# Patient Record
Sex: FEMALE | Race: WHITE | NOT HISPANIC OR LATINO | Employment: FULL TIME | URBAN - NONMETROPOLITAN AREA
[De-identification: names, ages, dates, MRNs, and addresses within clinical notes are randomized per-mention and may not be internally consistent; named-entity substitution may affect disease eponyms.]

---

## 2017-01-17 ENCOUNTER — OFFICE VISIT (OUTPATIENT)
Dept: FAMILY MEDICINE | Facility: OTHER | Age: 32
End: 2017-01-17
Payer: COMMERCIAL

## 2017-01-17 VITALS
RESPIRATION RATE: 12 BRPM | SYSTOLIC BLOOD PRESSURE: 112 MMHG | DIASTOLIC BLOOD PRESSURE: 74 MMHG | WEIGHT: 166 LBS | HEART RATE: 87 BPM | TEMPERATURE: 97.1 F | HEIGHT: 62 IN | BODY MASS INDEX: 30.55 KG/M2 | OXYGEN SATURATION: 100 %

## 2017-01-17 DIAGNOSIS — L73.9 FOLLICULITIS: Primary | ICD-10-CM

## 2017-01-17 PROCEDURE — 99213 OFFICE O/P EST LOW 20 MIN: CPT | Performed by: INTERNAL MEDICINE

## 2017-01-17 RX ORDER — CEPHALEXIN 500 MG/1
500 CAPSULE ORAL 3 TIMES DAILY
Qty: 21 CAPSULE | Refills: 0 | Status: SHIPPED | OUTPATIENT
Start: 2017-01-17 | End: 2017-10-27

## 2017-01-17 ASSESSMENT — PAIN SCALES - GENERAL: PAINLEVEL: NO PAIN (0)

## 2017-01-17 NOTE — PROGRESS NOTES
SUBJECTIVE:                                                    Martin Daigle is a 31 year old female who presents to clinic today for the following health issues:      Chief Complaint   Patient presents with     Derm Problem     on face since yesterday       CHIEF COMPLAINT:    The patient is a pleasant 31-year-old female who presents today with a single skin lesion on her left cheek. It appears to be a slightly irritated and inflamed follicle with a surrounding area of erythema about 2 cm. No other specific lesions are noted. There is a slightly purulent center and a honey colored crust. She notes no other specific lesions of concern. She does have small children and is concerned regarding the possibility of this being contagious. It is minimally tender to touch.                         PAST, FAMILY,SOCIAL HISTORY:     Medical  History:   has a past medical history of Tinea versicolor (2010); ANXIETY STATE NOS (2001); ABNORM ELECTROCARDIOGRAM (2001); Syncope and collapse (2001); and maternal GBS (group B streptococcus) affected , pregnant (2/3/2016).     Surgical History:   has past surgical history that includes CREATE EARDRUM OPENING,GEN ANESTH and NONSPECIFIC PROCEDURE.     Social History:   reports that she has never smoked. She has never used smokeless tobacco. She reports that she drinks alcohol. She reports that she does not use illicit drugs.     Family History:  family history includes Allergies in her sister; CEREBROVASCULAR DISEASE in her paternal grandfather; DIABETES in her father and paternal grandfather; HEART DISEASE in her maternal grandfather; Hypertension in her father; OSTEOPOROSIS in her maternal grandmother.            MEDICATIONS  Current Outpatient Prescriptions   Medication Sig Dispense Refill     cephALEXin (KEFLEX) 500 MG capsule Take 1 capsule (500 mg) by mouth 3 times daily 21 capsule 0     mupirocin (BACTROBAN NASAL) 2 % nasal ointment Apply 1 g into  each nare 3 times daily for 7 days 10 g 0     norgestim-eth estrad triphasic (ORTHO TRI-CYCLEN, 28,) 0.18/0.215/0.25 MG-35 MCG per tablet Take 1 tablet by mouth daily 84 tablet 4     Prenatal Vit-DSS-Fe Cbn-FA (PRENATAL AD) TABS Take 1 tablet by mouth daily. 100 tablet 4         --------------------------------------------------------------------------------------------------------------------                          REVIEW OF SYSTEMS:         LUNGS: Pt denies: cough,excess sputum, hemoptysis, or shortness of breath.   HEART: Pt denies: chest pain, arrythmia, syncope, tachy or bradyarrhythmia or excess edema.   GI: Pt denies: nausea, vomitting, diarrhea, constipation, melena, or hematochezia.                          EXAMINATION:          Vital Signs:  B/P: 112/74, T: 97.1, P: 87, R: 12, BMI: Body mass index is 30.35 kg/(m^2).   Constitutional: The patient appears to be in no acute distress. The patient appears to be adequately hydrated. No acute respiratory or hemodynamic distress is noted at this time.   LUNGS: clear bilaterally, airflow is brisk, no intercostal retraction or stridor is noted. No coughing is noted during visit.   HEART:  regular without rubs, clicks, gallops, or murmurs. PMI is nondisplaced. Upstrokes are brisk. S1,S2 are heard.   SKIN:  warm and dry. Only the lesion is noted as above.                          DECISION MAKING:       #1 folliculitis face   Recommend Keflex 500 mg 3 times daily for 7 days (patient is breast-feeding)   Recommend Bactroban ointment 2% 1 dab 3 times daily                             FOLLOW UP    I have asked the patient to make an appointment for follow up with me if symptoms do not improve.    I have carefully explained the diagnosis and treatment options with the patient. The patient has displayed an understanding of the above, and all subsequent questions were answered.         DO LUISA Dan    Portions of this note were produced using Roundrate  360  Although every attempt at real-time proof reading has been made, occasional grammar/syntax errors may have been missed.

## 2017-09-23 ENCOUNTER — TELEPHONE (OUTPATIENT)
Dept: FAMILY MEDICINE | Facility: CLINIC | Age: 32
End: 2017-09-23

## 2017-09-23 DIAGNOSIS — Z30.011 ENCOUNTER FOR INITIAL PRESCRIPTION OF CONTRACEPTIVE PILLS: ICD-10-CM

## 2017-09-25 RX ORDER — NORGESTIMATE AND ETHINYL ESTRADIOL 7DAYSX3 28
1 KIT ORAL DAILY
Qty: 28 TABLET | Refills: 0 | Status: SHIPPED | OUTPATIENT
Start: 2017-09-25 | End: 2017-10-27

## 2017-09-25 NOTE — TELEPHONE ENCOUNTER
Rx refilled per RN protocol.  1 month    Will forward to schedulers to schedule patient for OV.  Laura Hood RN

## 2017-09-25 NOTE — TELEPHONE ENCOUNTER
norgestim-eth estrad triphasic (ORTHO TRI-CYCLEN, 28,) 0.18/0.215/0.25 MG-35 MCG per tablet      Last Written Prescription Date: 9/22/16  Last Fill Quantity: 84,  # refills: 4   Last Office Visit with FMELIZABET, [facility] or [facility] prescribing provider: 1/17/17

## 2017-10-04 ENCOUNTER — ALLIED HEALTH/NURSE VISIT (OUTPATIENT)
Dept: FAMILY MEDICINE | Facility: OTHER | Age: 32
End: 2017-10-04
Payer: COMMERCIAL

## 2017-10-04 DIAGNOSIS — Z23 NEED FOR PROPHYLACTIC VACCINATION AND INOCULATION AGAINST INFLUENZA: Primary | ICD-10-CM

## 2017-10-04 PROCEDURE — 90471 IMMUNIZATION ADMIN: CPT

## 2017-10-04 PROCEDURE — 90686 IIV4 VACC NO PRSV 0.5 ML IM: CPT

## 2017-10-04 PROCEDURE — 99207 ZZC NO CHARGE NURSE ONLY: CPT

## 2017-10-04 NOTE — MR AVS SNAPSHOT
After Visit Summary   10/4/2017    Martin Daigle    MRN: [id number]           Patient Information     Date Of Birth          1985        Visit Information        Provider Department      10/4/2017 4:00 PM DENZEL MUELLER NURSE, [facility]        Today's Diagnoses     Need for prophylactic vaccination and inoculation against influenza    -  1       Follow-ups after your visit        Your next 10 appointments already scheduled     Oct 27, 2017 11:30 AM CDT   [facility]    [address] [place] [id number] [phone]              Who to contact     If you have questions or need follow up information about today's clinic visit or your schedule please contact [facility] directly at [phone].  Normal or non-critical lab and imaging results will be communicated to you by MyChart, letter or phone within 4 business days after the clinic has received the results. If you do not hear from us within 7 days, please contact the clinic through MyChart or phone. If you have a critical or abnormal lab result, we will notify you by phone as soon as possible.  Submit refill requests through [employer] or call your pharmacy and they will forward the refill request to us. Please allow 3 business days for your refill to be completed.          Additional Information About Your Visit        MyChart Information     [employer] gives you secure access to your electronic health record. If you see a primary care provider, you can also send messages to your care team and make appointments. If you have questions, please call your primary care clinic.  If you do not have a primary care provider, please call [phone] and they will assist you.        Care EveryWhere ID     This is your Care EveryWhere ID. This could be used by other organizations to access your [facility] medical records  [id number]          Blood Pressure from Last 3 Encounters:   01/17/17 112/74   11/25/16 120/70   09/22/16 108/62    Weight from Last 3 Encounters:   01/17/17 166 lb (75.3 kg)   11/25/16 167 lb (75.8 kg)   11/21/16 167 lb (75.8 kg)              We Performed the Following     FLU VAC, SPLIT VIRUS IM > 3 YO (QUADRIVALENT) [10497]     Vaccine Administration, Initial [38103]        Primary Care Provider Office Phone # Fax #    Jerald Fenton [phone] [address] [place] [address]        Equal Access to Services     [facility]: Hadii ruben Putnam, waaxda luqanette, qaybta kaalmada nela, earnest johns. So [facility] [phone].    ATENCIÓN: Si habla español, tiene a abarca disposición servicios gratuitos de asistencia lingüística. [facility] [phone].    We comply with applicable federal civil rights laws and [place] laws. We do not discriminate on the basis of race, color, national origin, age, disability, sex, sexual orientation, or gender identity.            Thank you!     Thank you for choosing [facility]  for your care. Our goal is always to provide you with excellent care. Hearing back from our patients is one way we can continue to improve our services. Please take a few minutes to complete the written survey that you may receive in the mail after your visit with us. Thank you!             Your Updated Medication List - Protect others around you: Learn how to safely use, store and throw away your medicines at www.disposemymeds.org.          This list is accurate as of: 10/4/17  4:35 PM.  Always use your most recent med list.                   Brand Name Dispense Instructions for use Diagnosis    cephALEXin 500 MG capsule    KEFLEX    21 capsule    Take 1 capsule (500 mg) by mouth 3 times daily    Folliculitis       norgestim-eth estrad triphasic 0.18/0.215/0.25 MG-35 MCG per tablet    TRI-LINYAH    28 tablet    Take 1 tablet by mouth  daily Appointment needed for additional refills.    Encounter for initial prescription of contraceptive pills       PRENATAL AD Tabs     100 tablet    Take 1 tablet by mouth daily.    Prenatal consult, Routine general medical examination at a health care facility

## 2017-10-04 NOTE — PROGRESS NOTES
Injectable Influenza Immunization Documentation    1.  Is the person to be vaccinated sick today?   No    2. Does the person to be vaccinated have an allergy to a component   of the vaccine?   No    3. Has the person to be vaccinated ever had a serious reaction   to influenza vaccine in the past?   No    4. Has the person to be vaccinated ever had Guillain-Barré syndrome?   No    Form completed by Milena Shultz MA     10/4/2017  Prior to injection verified patient identity using patient's name and date of birth.

## 2017-10-27 ENCOUNTER — OFFICE VISIT (OUTPATIENT)
Dept: FAMILY MEDICINE | Facility: CLINIC | Age: 32
End: 2017-10-27
Payer: COMMERCIAL

## 2017-10-27 VITALS
OXYGEN SATURATION: 99 % | SYSTOLIC BLOOD PRESSURE: 100 MMHG | HEART RATE: 88 BPM | RESPIRATION RATE: 18 BRPM | WEIGHT: 163 LBS | HEIGHT: 62 IN | BODY MASS INDEX: 30 KG/M2 | TEMPERATURE: 98.2 F | DIASTOLIC BLOOD PRESSURE: 56 MMHG

## 2017-10-27 DIAGNOSIS — Z23 ENCOUNTER FOR IMMUNIZATION: ICD-10-CM

## 2017-10-27 DIAGNOSIS — Z00.00 ENCOUNTER FOR ROUTINE ADULT HEALTH EXAMINATION WITHOUT ABNORMAL FINDINGS: Primary | ICD-10-CM

## 2017-10-27 DIAGNOSIS — Z30.41 ENCOUNTER FOR SURVEILLANCE OF CONTRACEPTIVE PILLS: ICD-10-CM

## 2017-10-27 PROCEDURE — 90471 IMMUNIZATION ADMIN: CPT | Performed by: FAMILY MEDICINE

## 2017-10-27 PROCEDURE — 99395 PREV VISIT EST AGE 18-39: CPT | Mod: 25 | Performed by: FAMILY MEDICINE

## 2017-10-27 PROCEDURE — 90632 HEPA VACCINE ADULT IM: CPT | Performed by: FAMILY MEDICINE

## 2017-10-27 RX ORDER — NORGESTIMATE AND ETHINYL ESTRADIOL 7DAYSX3 28
1 KIT ORAL DAILY
Qty: 84 TABLET | Refills: 4 | Status: SHIPPED | OUTPATIENT
Start: 2017-10-27 | End: 2018-06-11

## 2017-10-27 ASSESSMENT — PAIN SCALES - GENERAL: PAINLEVEL: NO PAIN (0)

## 2017-10-27 NOTE — MR AVS SNAPSHOT
"              After Visit Summary   10/27/2017    Martin Daigle    MRN: [id number]           Patient Information     Date Of Birth          1985        Visit Information        Provider Department      10/27/2017 11:30 AM Jerald Fenton MD [facility]        Today's Diagnoses     Encounter for routine adult health examination without abnormal findings    -  1    Encounter for surveillance of contraceptive pills        Encounter for immunization           Follow-ups after your visit        Who to contact     If you have questions or need follow up information about today's clinic visit or your schedule please contact [facility] directly at [phone].  Normal or non-critical lab and imaging results will be communicated to you by MyChart, letter or phone within 4 business days after the clinic has received the results. If you do not hear from us within 7 days, please contact the clinic through [employer] or phone. If you have a critical or abnormal lab result, we will notify you by phone as soon as possible.  Submit refill requests through [employer] or call your pharmacy and they will forward the refill request to us. Please allow 3 business days for your refill to be completed.          Additional Information About Your Visit        MyChart Information     [employer] gives you secure access to your electronic health record. If you see a primary care provider, you can also send messages to your care team and make appointments. If you have questions, please call your primary care clinic.  If you do not have a primary care provider, please call [phone] and they will assist you.        Care EveryWhere ID     This is your Care EveryWhere ID. This could be used by other organizations to access your [facility] [facility] medical records  [id number]        Your Vitals Were     Pulse Temperature Respirations Height Last Period Pulse Oximetry    88 98.2  F (36.8  C) (Temporal) 18 5' 2\" (1.575 " m) 10/18/2017 99%    Breastfeeding? BMI (Body Mass Index)                No 29.81 kg/m2           Blood Pressure from Last 3 Encounters:   10/27/17 100/56   01/17/17 112/74   11/25/16 120/70    Weight from Last 3 Encounters:   10/27/17 163 lb (73.9 kg)   01/17/17 166 lb (75.3 kg)   11/25/16 167 lb (75.8 kg)              We Performed the Following     ADMIN 1st VACCINE     HEPATITIS A VACCINE (ADULT)          Today's Medication Changes          These changes are accurate as of: 10/27/17 11:59 PM.  If you have any questions, ask your nurse or doctor.               These medicines have changed or have updated prescriptions.        Dose/Directions    norgestim-eth estrad triphasic 0.18/0.215/0.25 MG-35 MCG per tablet   Commonly known as:  TRI-LINYAH   This may have changed:  additional instructions   Used for:  Encounter for surveillance of contraceptive pills   Changed by:  Jerald Fenton MD        Dose:  1 tablet   Take 1 tablet by mouth daily   Quantity:  84 tablet   Refills:  4         Stop taking these medicines if you haven't already. Please contact your care team if you have questions.     cephALEXin 500 MG capsule   Commonly known as:  KEFLEX   Stopped by:  Jerald Fenton MD                Where to get your medicines      These medications were sent to [facility] [address]   [address] [facility] [address]     Phone:  [phone]     norgestim-eth estrad triphasic 0.18/0.215/0.25 MG-35 MCG per tablet                Primary Care Provider Office Phone # Fax #    Jerald Fenton [phone]       [address] [place] [address]        Equal Access to Services     [facility]: Hadii ruben gant Sokorina, waaxda luqadaha, qaybta kaalmada adeegyada, earnest johns. So [facility] [phone].    ATENCIÓN: Si habla español, tiene a abarca disposición servicios gratuitos de asistencia lingüística. Llame al  [phone].    We comply with applicable federal civil rights laws and [place] laws. We do not discriminate on the basis of race, color, national origin, age, disability, sex, sexual orientation, or gender identity.            Thank you!     Thank you for choosing [facility]  for your care. Our goal is always to provide you with excellent care. Hearing back from our patients is one way we can continue to improve our services. Please take a few minutes to complete the written survey that you may receive in the mail after your visit with us. Thank you!             Your Updated Medication List - Protect others around you: Learn how to safely use, store and throw away your medicines at www.disposemymeds.org.          This list is accurate as of: 10/27/17 11:59 PM.  Always use your most recent med list.                   Brand Name Dispense Instructions for use Diagnosis    norgestim-eth estrad triphasic 0.18/0.215/0.25 MG-35 MCG per tablet    TRI-LINYAH    84 tablet    Take 1 tablet by mouth daily    Encounter for surveillance of contraceptive pills       PRENATAL AD Tabs     100 tablet    Take 1 tablet by mouth daily.    Prenatal consult, Routine general medical examination at a health care facility

## 2017-10-27 NOTE — PROGRESS NOTES
SUBJECTIVE:   CC: Martin Daigle is an 32 year old woman who presents for preventive health visit.     Physical   Annual:     Getting at least 3 servings of Calcium per day::  Yes    Bi-annual eye exam::  Yes    Dental care twice a year::  NO    Sleep apnea or symptoms of sleep apnea::  None    Diet::  Regular (no restrictions)    Frequency of exercise::  1 day/week    Duration of exercise::  30-45 minutes    Taking medications regularly::  Yes    Medication side effects::  Not applicable    Additional concerns today::  YES        Today's PHQ-2 Score:   PHQ-2 ( 1999 Pfizer) 10/24/2017   Q1: Little interest or pleasure in doing things 1   Q2: Feeling down, depressed or hopeless 1   PHQ-2 Score 2   Q1: Little interest or pleasure in doing things Several days   Q2: Feeling down, depressed or hopeless Several days   PHQ-2 Score 2       Abuse: Current or Past(Physical, Sexual or Emotional)- No  Do you feel safe in your environment - Yes    Social History   Substance Use Topics     Smoking status: Never Smoker     Smokeless tobacco: Never Used     Alcohol use No     The patient does not drink >3 drinks per day nor >7 drinks per week.    Reviewed orders with patient.  Reviewed health maintenance and updated orders accordingly - Yes  Labs reviewed in EPIC    Mammogram not appropriate for this patient based on age.    Pertinent mammograms are reviewed under the imaging tab.  History of abnormal Pap smear: NO - age 30- 65 PAP every 3 years recommended    Reviewed and updated as needed this visit by clinical staff  Tobacco  Allergies  Meds  Problems  Med Hx  Surg Hx  Fam Hx  Soc Hx          Reviewed and updated as needed this visit by Provider  Allergies  Meds  Problems  Med Hx  Surg Hx  Fam Hx            Review of Systems   Constitutional: Negative for chills, fatigue and fever.   HENT: Negative for congestion, ear pain, hearing loss and sore throat.    Eyes: Negative for photophobia, pain and visual  "disturbance.   Respiratory: Negative for cough, chest tightness, shortness of breath and wheezing.    Cardiovascular: Negative for chest pain, palpitations and peripheral edema.   Gastrointestinal: Negative for abdominal pain, constipation, diarrhea, heartburn, hematochezia, nausea and vomiting.   Endocrine: Negative for cold intolerance, heat intolerance, polydipsia and polyuria.   Genitourinary: Negative for dysuria, frequency, genital sores, hematuria, pelvic pain, urgency, vaginal bleeding, vaginal discharge and vaginal pain.   Musculoskeletal: Negative for arthralgias, back pain, joint swelling, myalgias and neck pain.   Skin: Negative for rash.   Allergic/Immunologic: Negative for environmental allergies.   Neurological: Negative for dizziness, tremors, syncope, weakness, numbness, headaches and paresthesias.   Psychiatric/Behavioral: Negative for mood changes. The patient is not nervous/anxious.         OBJECTIVE:   /56  Pulse 88  Temp 98.2  F (36.8  C) (Temporal)  Resp 18  Ht 5' 2\" (1.575 m)  Wt 163 lb (73.9 kg)  LMP 10/18/2017  SpO2 99%  Breastfeeding? No  BMI 29.81 kg/m2  Physical Exam   Constitutional: She is oriented to person, place, and time. Vital signs are normal. She appears well-developed and well-nourished. No distress.   HENT:   Right Ear: Hearing, external ear and ear canal normal. Tympanic membrane is perforated.   Left Ear: Hearing, external ear and ear canal normal. Tympanic membrane is scarred.   Nose: Nose normal.   Mouth/Throat: Uvula is midline, oropharynx is clear and moist and mucous membranes are normal. No oropharyngeal exudate or posterior oropharyngeal erythema.   Old TM perforation   Eyes: Conjunctivae, EOM and lids are normal. Pupils are equal, round, and reactive to light. Right eye exhibits no discharge. Left eye exhibits no discharge.   Neck: Normal range of motion. Neck supple. No tracheal deviation present. No thyromegaly present.   Cardiovascular: Normal rate, " "regular rhythm, S1 normal, S2 normal, normal heart sounds and normal pulses.  Exam reveals no S3, no S4 and no friction rub.    No murmur heard.  Pulmonary/Chest: Effort normal and breath sounds normal. No respiratory distress. She has no wheezes. She has no rales.   Abdominal: Soft. Normal appearance and bowel sounds are normal. She exhibits no mass. There is no hepatosplenomegaly. There is no tenderness.   Musculoskeletal: Normal range of motion. She exhibits no edema.   Lymphadenopathy:     She has no cervical adenopathy.        Right: No supraclavicular adenopathy present.        Left: No supraclavicular adenopathy present.   Neurological: She is alert and oriented to person, place, and time. She has normal strength and normal reflexes. No cranial nerve deficit or sensory deficit. She exhibits normal muscle tone.   Skin: Skin is warm, dry and intact. No rash noted.   Psychiatric: She has a normal mood and affect. Judgment and thought content normal. Cognition and memory are normal.       ASSESSMENT/PLAN:       ICD-10-CM    1. Encounter for routine adult health examination without abnormal findings Z00.00    2. Encounter for surveillance of contraceptive pills Z30.41 norgestim-eth estrad triphasic (TRI-LINYAH) 0.18/0.215/0.25 MG-35 MCG per tablet       COUNSELING:  Reviewed preventive health counseling, as reflected in patient instructions       Regular exercise       Healthy diet/nutrition       Vision screening       Family planning         reports that she has never smoked. She has never used smokeless tobacco.    Estimated body mass index is 29.81 kg/(m^2) as calculated from the following:    Height as of this encounter: 5' 2\" (1.575 m).    Weight as of this encounter: 163 lb (73.9 kg).   Weight management plan: Discussed healthy diet and exercise guidelines and patient will follow up in 12 months in clinic to re-evaluate.    Counseling Resources:  ATP IV Guidelines  Pooled Cohorts Equation Calculator  Breast " Cancer Risk Calculator  FRAX Risk Assessment  ICSI Preventive Guidelines  Dietary Guidelines for Americans, 2010  [employer] MyPlate  ASA Prophylaxis  Lung CA Screening    Jerald Fenton MD  [facility]  Answers for HPI/ROS submitted by the patient on 10/24/2017   PHQ-2 Score: 2

## 2017-10-27 NOTE — NURSING NOTE
"Chief Complaint   Patient presents with     Physical       Initial /56  Pulse 88  Temp 98.2  F (36.8  C) (Temporal)  Resp 18  Ht 5' 2\" (1.575 m)  Wt 163 lb (73.9 kg)  LMP 10/18/2017  SpO2 99%  Breastfeeding? No  BMI 29.81 kg/m2 Estimated body mass index is 29.81 kg/(m^2) as calculated from the following:    Height as of this encounter: 5' 2\" (1.575 m).    Weight as of this encounter: 163 lb (73.9 kg).  Medication Reconciliation: complete    "

## 2017-10-28 ASSESSMENT — ENCOUNTER SYMPTOMS
BACK PAIN: 0
POLYDIPSIA: 0
ABDOMINAL PAIN: 0
HEMATURIA: 0
TREMORS: 0
NECK PAIN: 0
EYE PAIN: 0
FREQUENCY: 0
WEAKNESS: 0
DYSURIA: 0
SORE THROAT: 0
SHORTNESS OF BREATH: 0
PALPITATIONS: 0
COUGH: 0
ARTHRALGIAS: 0
CONSTIPATION: 0
WHEEZING: 0
PARESTHESIAS: 0
DIARRHEA: 0
DIZZINESS: 0
VOMITING: 0
CHEST TIGHTNESS: 0
NERVOUS/ANXIOUS: 0
CHILLS: 0
NAUSEA: 0
MYALGIAS: 0
HEADACHES: 0
HEMATOCHEZIA: 0
FEVER: 0
FATIGUE: 0
NUMBNESS: 0
HEARTBURN: 0
JOINT SWELLING: 0
PHOTOPHOBIA: 0

## 2018-04-09 ENCOUNTER — HOSPITAL ENCOUNTER (EMERGENCY)
Facility: CLINIC | Age: 33
Discharge: HOME | End: 2018-04-09
Attending: FAMILY MEDICINE | Admitting: FAMILY MEDICINE
Payer: COMMERCIAL

## 2018-04-09 VITALS
OXYGEN SATURATION: 100 % | SYSTOLIC BLOOD PRESSURE: 132 MMHG | DIASTOLIC BLOOD PRESSURE: 85 MMHG | RESPIRATION RATE: 13 BRPM

## 2018-04-09 DIAGNOSIS — I49.3 VENTRICULAR PREMATURE BEATS: ICD-10-CM

## 2018-04-09 DIAGNOSIS — R00.2 PALPITATIONS: ICD-10-CM

## 2018-04-09 DIAGNOSIS — R10.84 ABDOMINAL PAIN, GENERALIZED: ICD-10-CM

## 2018-04-09 LAB
ALBUMIN SERPL-MCNC: 3.7 G/DL (ref 3.4–5)
ALBUMIN UR-MCNC: NEGATIVE MG/DL
ALP SERPL-CCNC: 54 U/L (ref 40–150)
ALT SERPL W P-5'-P-CCNC: 13 U/L (ref 0–50)
ANION GAP SERPL CALCULATED.3IONS-SCNC: 6 MMOL/L (ref 3–14)
APPEARANCE UR: ABNORMAL
AST SERPL W P-5'-P-CCNC: 11 U/L (ref 0–45)
BASOPHILS # BLD AUTO: 0 10E9/L (ref 0–0.2)
BASOPHILS NFR BLD AUTO: 0.4 %
BILIRUB SERPL-MCNC: 0.3 MG/DL (ref 0.2–1.3)
BILIRUB UR QL STRIP: NEGATIVE
BUN SERPL-MCNC: 13 MG/DL (ref 7–30)
CALCIUM SERPL-MCNC: 8.7 MG/DL (ref 8.5–10.1)
CHLORIDE SERPL-SCNC: 103 MMOL/L (ref 94–109)
CO2 SERPL-SCNC: 28 MMOL/L (ref 20–32)
COLOR UR AUTO: YELLOW
CREAT SERPL-MCNC: 0.76 MG/DL (ref 0.52–1.04)
DIFFERENTIAL METHOD BLD: NORMAL
EOSINOPHIL # BLD AUTO: 0.2 10E9/L (ref 0–0.7)
EOSINOPHIL NFR BLD AUTO: 1.8 %
ERYTHROCYTE [DISTWIDTH] IN BLOOD BY AUTOMATED COUNT: 13.2 % (ref 10–15)
GFR SERPL CREATININE-BSD FRML MDRD: 89 ML/MIN/1.7M2
GLUCOSE SERPL-MCNC: 87 MG/DL (ref 70–99)
GLUCOSE UR STRIP-MCNC: NEGATIVE MG/DL
HCT VFR BLD AUTO: 39.8 % (ref 35–47)
HGB BLD-MCNC: 13.1 G/DL (ref 11.7–15.7)
HGB UR QL STRIP: NEGATIVE
IMM GRANULOCYTES # BLD: 0 10E9/L (ref 0–0.4)
IMM GRANULOCYTES NFR BLD: 0.1 %
KETONES UR STRIP-MCNC: 5 MG/DL
LEUKOCYTE ESTERASE UR QL STRIP: ABNORMAL
LYMPHOCYTES # BLD AUTO: 2.6 10E9/L (ref 0.8–5.3)
LYMPHOCYTES NFR BLD AUTO: 25.2 %
MCH RBC QN AUTO: 28.1 PG (ref 26.5–33)
MCHC RBC AUTO-ENTMCNC: 32.9 G/DL (ref 31.5–36.5)
MCV RBC AUTO: 85 FL (ref 78–100)
MONOCYTES # BLD AUTO: 0.7 10E9/L (ref 0–1.3)
MONOCYTES NFR BLD AUTO: 7.2 %
NEUTROPHILS # BLD AUTO: 6.7 10E9/L (ref 1.6–8.3)
NEUTROPHILS NFR BLD AUTO: 65.3 %
NITRATE UR QL: NEGATIVE
PH UR STRIP: 5 PH (ref 5–7)
PLATELET # BLD AUTO: 363 10E9/L (ref 150–450)
POTASSIUM SERPL-SCNC: 3.4 MMOL/L (ref 3.4–5.3)
PROT SERPL-MCNC: 7.2 G/DL (ref 6.8–8.8)
RBC # BLD AUTO: 4.67 10E12/L (ref 3.8–5.2)
SODIUM SERPL-SCNC: 137 MMOL/L (ref 133–144)
SOURCE: ABNORMAL
SP GR UR STRIP: 1.03 (ref 1–1.03)
TROPONIN I SERPL-MCNC: <0.015 UG/L (ref 0–0.04)
TSH SERPL DL<=0.005 MIU/L-ACNC: 2.74 MU/L (ref 0.4–4)
UROBILINOGEN UR STRIP-MCNC: 0 MG/DL (ref 0–2)
WBC # BLD AUTO: 10.3 10E9/L (ref 4–11)

## 2018-04-09 PROCEDURE — 93010 ELECTROCARDIOGRAM REPORT: CPT | Mod: Z6 | Performed by: FAMILY MEDICINE

## 2018-04-09 PROCEDURE — 84484 ASSAY OF TROPONIN QUANT: CPT | Performed by: FAMILY MEDICINE

## 2018-04-09 PROCEDURE — 80053 COMPREHEN METABOLIC PANEL: CPT | Performed by: FAMILY MEDICINE

## 2018-04-09 PROCEDURE — 81003 URINALYSIS AUTO W/O SCOPE: CPT | Performed by: FAMILY MEDICINE

## 2018-04-09 PROCEDURE — 84443 ASSAY THYROID STIM HORMONE: CPT | Performed by: FAMILY MEDICINE

## 2018-04-09 PROCEDURE — 93005 ELECTROCARDIOGRAM TRACING: CPT | Performed by: FAMILY MEDICINE

## 2018-04-09 PROCEDURE — 99284 EMERGENCY DEPT VISIT MOD MDM: CPT | Mod: 25 | Performed by: FAMILY MEDICINE

## 2018-04-09 PROCEDURE — 99284 EMERGENCY DEPT VISIT MOD MDM: CPT | Performed by: FAMILY MEDICINE

## 2018-04-09 PROCEDURE — 85025 COMPLETE CBC W/AUTO DIFF WBC: CPT | Performed by: FAMILY MEDICINE

## 2018-04-09 NOTE — DISCHARGE INSTRUCTIONS
Thank you for giving us the opportunity to see you. The impression is that you're having occasional premature ventricular contractions.  These are not serious by themselves.  Please see the attached handout.    Continue to monitor for new or worsening symptoms such as dizziness, chest pain or shortness of breath.    If you are not seeing an improvement with the abdominal pain within 4-5 days, please follow up with your primary care provider or clinic.     After discharge, please closely monitor for any new or worsening symptoms. Return to the Emergency Department at any time if your symptoms worsen.

## 2018-04-09 NOTE — ED AVS SNAPSHOT
[facility] Emergency Department    [address] [place] [address]    Phone:  [phone]    Fax:  [phone]                                       Martin Daigle   MRN: [id number]    Department:  [facility] Emergency Department   Date of Visit:  4/9/2018           After Visit Summary Signature Page     I have received my discharge instructions, and my questions have been answered. I have discussed any challenges I see with this plan with the nurse or doctor.    ..........................................................................................................................................  Patient/Patient Representative Signature      ..........................................................................................................................................  Patient Representative Print Name and Relationship to Patient    ..................................................               ................................................  Date                                            Time    ..........................................................................................................................................  Reviewed by Signature/Title    ...................................................              ..............................................  Date                                                            Time

## 2018-04-09 NOTE — ED NOTES
Presents with palpitations that have been on going all day. States she has had them in the past but they usually resolve. She also noted that when she went to the bathroom this afternoon she developed low abd pain that wrapped around to her back. That fortunato has improved but has not resolved

## 2018-04-09 NOTE — ED PROVIDER NOTES
[facility] ED Provider Note   CC:     Chief Complaint   Patient presents with     Palpitations     Abdominal Pain     HPI:  Martin Daigle is a 33 year old female who presented to the emergency department with palpitations that are going on all day, and about half an hour ago acute onset of lower abdominal pain wrapping around to the back.  Patient states that this pain is dull, was intense for about 15 minutes with pain level of up to 9/10.  Her current pain level is 4-5/10.  She developed the pain in the abdomen after she voided.  She did not notice any hematuria or dysuria when she was voiding.  It was only after she had washed her hands and was leaving the bathroom that she developed the pain.  She's never had this pain before.  She has no associated chest pain, shortness of breath, fever, chills, nausea, vomiting, diaphoresis.  She denies any caffeine, tobacco or alcohol use.  She is not on any medications except for birth control pills.  Her menstrual period is coming up next week.    Problem List:  Patient Active Problem List    Diagnosis     CARDIOVASCULAR SCREENING; LDL GOAL LESS THAN 160       MEDS:   Discharge Medication List as of 4/9/2018  5:25 PM      CONTINUE these medications which have NOT CHANGED    Details   norgestim-eth estrad triphasic (TRI-LINYAH) 0.18/0.215/0.25 MG-35 MCG per tablet Take 1 tablet by mouth daily, Disp-84 tablet, R-4, E-Prescribe             ALLERGIES:    Allergies   Allergen Reactions     Penicillins        Past Surgical History:   Procedure Laterality Date     C NONSPECIFIC PROCEDURE      release trigger finger right hand     HC CREATE EARDRUM OPENING,GEN ANESTH      P.E. Tubes x 2       Social History   Substance Use Topics     Smoking status: Never Smoker     Smokeless tobacco: Never Used     Alcohol use No         Review of Systems   Except as noted in HPI, all other systems were reviewed and are  negative    Physical Exam     Vitals were reviewed  Patient Vitals for the past 8 hrs:   BP Heart Rate Resp SpO2   04/09/18 1740 132/85 - - -   04/09/18 1715 - 90 13 -   04/09/18 1700 - 79 18 -   04/09/18 1645 - 78 18 -   04/09/18 1630 - 82 16 -   04/09/18 1620 - 89 (!) 7 -   04/09/18 1610 - 81 21 -   04/09/18 1552 129/79 - - -   04/09/18 1550 - - 16 100 %     GENERAL APPEARANCE: Alert, slightly anxious, no distress  FACE: normal facies  EYES: Pupils are equal  HENT: normal external exam  NECK: no adenopathy or asymmetry  RESP: normal respiratory effort; clear breath sounds bilaterally  CV: regular rate and rhythm; no significant murmurs, gallops or rubs  ABD: soft, diffuse lower abdominal tenderness; no rebound or guarding; bowel sounds are normal  MS: no gross deformities noted; normal muscle tone.  EXT: No calf tenderness or pitting edema  SKIN: no worrisome rash  NEURO: no facial droop; no focal deficits, speech is normal  PSYCH: normal mood and affect      Available Lab/Imaging Results     Results for orders placed or performed during the hospital encounter of 04/09/18 (from the past 24 hour(s))   CBC with platelets differential   Result Value Ref Range    WBC 10.3 4.0 - 11.0 10e9/L    RBC Count 4.67 3.8 - 5.2 10e12/L    Hemoglobin 13.1 11.7 - 15.7 g/dL    Hematocrit 39.8 35.0 - 47.0 %    MCV 85 78 - 100 fl    MCH 28.1 26.5 - 33.0 pg    MCHC 32.9 31.5 - 36.5 g/dL    RDW 13.2 10.0 - 15.0 %    Platelet Count 363 150 - 450 10e9/L    Diff Method Automated Method     % Neutrophils 65.3 %    % Lymphocytes 25.2 %    % Monocytes 7.2 %    % Eosinophils 1.8 %    % Basophils 0.4 %    % Immature Granulocytes 0.1 %    Absolute Neutrophil 6.7 1.6 - 8.3 10e9/L    Absolute Lymphocytes 2.6 0.8 - 5.3 10e9/L    Absolute Monocytes 0.7 0.0 - 1.3 10e9/L    Absolute Eosinophils 0.2 0.0 - 0.7 10e9/L    Absolute Basophils 0.0 0.0 - 0.2 10e9/L    Abs Immature Granulocytes 0.0 0 - 0.4 10e9/L   Comprehensive metabolic panel   Result Value  Ref Range    Sodium 137 133 - 144 mmol/L    Potassium 3.4 3.4 - 5.3 mmol/L    Chloride 103 94 - 109 mmol/L    Carbon Dioxide 28 20 - 32 mmol/L    Anion Gap 6 3 - 14 mmol/L    Glucose 87 70 - 99 mg/dL    Urea Nitrogen 13 7 - 30 mg/dL    Creatinine 0.76 0.52 - 1.04 mg/dL    GFR Estimate 89 >60 mL/min/1.7m2    GFR Estimate If Black >90 >60 mL/min/1.7m2    Calcium 8.7 8.5 - 10.1 mg/dL    Bilirubin Total 0.3 0.2 - 1.3 mg/dL    Albumin 3.7 3.4 - 5.0 g/dL    Protein Total 7.2 6.8 - 8.8 g/dL    Alkaline Phosphatase 54 40 - 150 U/L    ALT 13 0 - 50 U/L    AST 11 0 - 45 U/L   Troponin I   Result Value Ref Range    Troponin I ES <0.015 0.000 - 0.045 ug/L   TSH   Result Value Ref Range    TSH 2.74 0.40 - 4.00 mU/L   UA reflex to Microscopic   Result Value Ref Range    Color Urine Yellow     Appearance Urine Cloudy     Glucose Urine Negative NEG^Negative mg/dL    Bilirubin Urine Negative NEG^Negative    Ketones Urine 5 (A) NEG^Negative mg/dL    Specific Gravity Urine 1.029 1.003 - 1.035    Blood Urine Negative NEG^Negative    pH Urine 5.0 5.0 - 7.0 pH    Protein Albumin Urine Negative NEG^Negative mg/dL    Urobilinogen mg/dL 0.0 0.0 - 2.0 mg/dL    Nitrite Urine Negative NEG^Negative    Leukocyte Esterase Urine Small (A) NEG^Negative    Source Midstream Urine        EKG reviewed by me: Normal sinus rhythm with occasional ectopic ventricular beat.  Normal FL, QT and QRS intervals.  Normal axis.  Impression: Normal sinus rhythm with occasional PVC.  No acute ischemic findings.      Impression     Final diagnoses:   Palpitations   Ventricular premature beats   Abdominal pain, generalized       ED Course & Medical Decision Making   Martin Daigle is a 33 year old female who presented to the emergency department with palpitations that were going on all day, worse in the past it would be more intermittent.  She became very anxious about the palpitations, and about an hour before coming to the emergency department, had a brief episode  of lower abdominal pain coming from the back wrapping around to the front.  Patient did not have any chest pain, dizziness, or shortness of breath associated with the palpitations.  Patient was seen shortly after arrival.  Her blood pressure, heart rate, respiratory rate, and oxygen saturation saturations were normal.  EKG revealed occasional ventricular ectopic beat.  With deep breathing and holding her breath, was able to trigger more PVCs.  The patient's workup reveals a normal CBC, comprehensive metabolic panel, troponin and TSH.  Urine reveals 5 ketones, and small leukocytes.  The patient denied having any alcohol or caffeine, and I recommended that she tried to look carefully at her diet to make sure she is not consuming any stimulants.  She was reassured that the PVCs are likely benign and are not causing any symptoms.  She should watch for chest pain, dizziness or shortness of breath associated with the PVCs.  Follow up with her primary care provider if the palpitations increase.  If this occurs then consider Holter monitoring.  The cause of her brief abdominal pain is uncertain but there does not appear to be a serious cause.  Return to the ED at any time if symptoms worsen.      Written after-visit summary and instructions were given at the time of discharge.      Discharge Medication List as of 4/9/2018  5:25 PM            This note was completed in part using Dragon voice recognition, and may contain word and grammatical errors.        Salvador Rutledge MD  04/09/18 2047

## 2018-04-09 NOTE — ED AVS SNAPSHOT
[facility] Emergency Department    [address]     [place] [address]    Phone:  [phone]    Fax:  [phone]                                       Martin Daigle   MRN: [id number]    Department:  [facility] Emergency Department   Date of Visit:  4/9/2018           Patient Information     Date Of Birth          1985        Your diagnoses for this visit were:     Palpitations     Ventricular premature beats     Abdominal pain, generalized        You were seen by Salvador Rutledge MD.      Follow-up Information     Follow up with Jerald Fenton MD In 4 days.    Specialty:  Family Practice    Why:  if not improving    Contact information:    [address]   [place] [id number] [phone]          Follow up with [facility] Emergency Department.    Specialty:  EMERGENCY MEDICINE    Why:  If symptoms worsen    Contact information:    [address]  [place] [id number] [phone]    Additional information:    From [address]: Exit at [address] on south side of [place]. Turn right on [address]. Turn left at stoplight on [address]. [facility] will be in view two blocks ahead        Discharge Instructions       Thank you for giving us the opportunity to see you. The impression is that you're having occasional premature ventricular contractions.  These are not serious by themselves.  Please see the attached handout.    Continue to monitor for new or worsening symptoms such as dizziness, chest pain or shortness of breath.    If you are not seeing an improvement with the abdominal pain within 4-5 days, please follow up with your primary care provider or clinic.     After discharge, please closely monitor for any new or worsening symptoms. Return to the Emergency Department at any time if your symptoms worsen.        Discharge References/Attachments     PALPITATIONS (ENGLISH)    ABDOMINAL PAIN, UNKNOWN CAUSE, (FEMALE) (ENGLISH)       24 Hour Appointment Hotline       To make an appointment at any [facility], call [phone] ([phone]). If you don't have a family doctor or clinic, we will help you find one. [facility] clinics are conveniently located to serve the needs of you and your family.             Review of your medicines      Our records show that you are taking the medicines listed below. If these are incorrect, please call your family doctor or clinic.        Dose / Directions Last dose taken    norgestim-eth estrad triphasic 0.18/0.215/0.25 MG-35 MCG per tablet   Commonly known as:  TRI-LINYAH   Dose:  1 tablet   Quantity:  84 tablet        Take 1 tablet by mouth daily   Refills:  4                Procedures and tests performed during your visit     CBC with platelets differential    Comprehensive metabolic panel    EKG 12-lead, tracing only    Peripheral IV catheter    TSH    Troponin I    UA reflex to Microscopic      Orders Needing Specimen Collection     None      Pending Results     No orders found from 4/7/2018 to 4/10/2018.            Pending Culture Results     No orders found from 4/7/2018 to 4/10/2018.            Pending Results Instructions     If you had any lab results that were not finalized at the time of your Discharge, you can call the ED Lab Result RN at [phone]. You will be contacted by this team for any positive Lab results or changes in treatment. The nurses are available 7 days a week from 10A to 6:30P.  You can leave a message 24 hours per day and they will return your call.        Thank you for choosing [facility]       Thank you for choosing [facility] for your care. Our goal is always to provide you with excellent care. Hearing back from our patients is one way we can continue to improve our services. Please take a few minutes to complete the written survey that you may receive in the mail after you visit with us. Thank you!        [employer] Information     [employer] gives you secure access to your  electronic health record. If you see a primary care provider, you can also send messages to your care team and make appointments. If you have questions, please call your primary care clinic.  If you do not have a primary care provider, please call [phone] and they will assist you.        Care EveryWhere ID     This is your Care EveryWhere ID. This could be used by other organizations to access your [place] medical records  [id number]        Equal Access to Services     DINO MCCALLUM : Stef Putnam, qi quiñones, ivet rondon, earnest johns. So [facility] [phone].    ATENCIÓN: Si habla español, tiene a abarca disposición servicios gratuitos de asistencia lingüística. Llame al [phone].    We comply with applicable federal civil rights laws and [place] laws. We do not discriminate on the basis of race, color, national origin, age, disability, sex, sexual orientation, or gender identity.            After Visit Summary       This is your record. Keep this with you and show to your community pharmacist(s) and doctor(s) at your next visit.

## 2018-04-28 ENCOUNTER — HOSPITAL ENCOUNTER (EMERGENCY)
Facility: CLINIC | Age: 33
Discharge: HOME | End: 2018-04-28
Attending: FAMILY MEDICINE | Admitting: FAMILY MEDICINE
Payer: COMMERCIAL

## 2018-04-28 VITALS
RESPIRATION RATE: 18 BRPM | TEMPERATURE: 98.9 F | HEART RATE: 100 BPM | SYSTOLIC BLOOD PRESSURE: 128 MMHG | DIASTOLIC BLOOD PRESSURE: 74 MMHG | OXYGEN SATURATION: 100 %

## 2018-04-28 DIAGNOSIS — H72.91 OTITIS MEDIA, SEROUS, TM RUPTURE, RIGHT: ICD-10-CM

## 2018-04-28 DIAGNOSIS — H65.91 OTITIS MEDIA, SEROUS, TM RUPTURE, RIGHT: ICD-10-CM

## 2018-04-28 DIAGNOSIS — H92.01 ACUTE EAR PAIN, RIGHT: ICD-10-CM

## 2018-04-28 DIAGNOSIS — J02.0 STREPTOCOCCAL SORE THROAT: ICD-10-CM

## 2018-04-28 DIAGNOSIS — R07.0 THROAT PAIN: ICD-10-CM

## 2018-04-28 LAB
DEPRECATED S PYO AG THROAT QL EIA: ABNORMAL
SPECIMEN SOURCE: ABNORMAL

## 2018-04-28 PROCEDURE — 25000132 ZZH RX MED GY IP 250 OP 250 PS 637: Performed by: FAMILY MEDICINE

## 2018-04-28 PROCEDURE — 87880 STREP A ASSAY W/OPTIC: CPT | Performed by: FAMILY MEDICINE

## 2018-04-28 PROCEDURE — 99284 EMERGENCY DEPT VISIT MOD MDM: CPT | Mod: Z6 | Performed by: FAMILY MEDICINE

## 2018-04-28 PROCEDURE — 99283 EMERGENCY DEPT VISIT LOW MDM: CPT | Performed by: FAMILY MEDICINE

## 2018-04-28 RX ORDER — ACETAMINOPHEN 500 MG
500-1000 TABLET ORAL EVERY 6 HOURS PRN
Refills: 0 | COMMUNITY
Start: 2018-04-28 | End: 2018-05-02

## 2018-04-28 RX ORDER — AZITHROMYCIN 250 MG/1
500 TABLET, FILM COATED ORAL DAILY
Qty: 8 TABLET | Refills: 0 | Status: SHIPPED | OUTPATIENT
Start: 2018-04-28 | End: 2018-05-02

## 2018-04-28 RX ORDER — AZITHROMYCIN 250 MG/1
500 TABLET, FILM COATED ORAL ONCE
Status: COMPLETED | OUTPATIENT
Start: 2018-04-28 | End: 2018-04-28

## 2018-04-28 RX ORDER — IBUPROFEN 200 MG
600 TABLET ORAL EVERY 6 HOURS PRN
Refills: 0 | COMMUNITY
Start: 2018-04-28 | End: 2018-05-01

## 2018-04-28 RX ADMIN — AZITHROMYCIN 500 MG: 250 TABLET, FILM COATED ORAL at 03:55

## 2018-04-28 NOTE — DISCHARGE INSTRUCTIONS
Please read and follow the handout(s) instructions. Return, if needed, for increased or worsening symptoms and as directed by the handout(s).    I hope you feel better soon!    Electronically signed, Armando Deleon DO

## 2018-04-28 NOTE — ED AVS SNAPSHOT
[facility] Emergency Department    [address] [place] [address]    Phone:  [phone]    Fax:  [phone]                                       Martin Daigle   MRN: [id number]    Department:  [facility] Emergency Department   Date of Visit:  4/28/2018           After Visit Summary Signature Page     I have received my discharge instructions, and my questions have been answered. I have discussed any challenges I see with this plan with the nurse or doctor.    ..........................................................................................................................................  Patient/Patient Representative Signature      ..........................................................................................................................................  Patient Representative Print Name and Relationship to Patient    ..................................................               ................................................  Date                                            Time    ..........................................................................................................................................  Reviewed by Signature/Title    ...................................................              ..............................................  Date                                                            Time

## 2018-04-28 NOTE — ED AVS SNAPSHOT
[facility] Emergency Department    [address] [place] [address]    Phone:  [phone]    Fax:  [phone]                                       Martin Daigle   MRN: [id number]    Department:  [facility] Emergency Department   Date of Visit:  4/28/2018           Patient Information     Date Of Birth          1985        Your diagnoses for this visit were:     Streptococcal sore throat     Throat pain     Acute ear pain, right     Otitis media, serous, tm rupture, right        You were seen by Armando De[facility] DO.      Follow-up Information     Follow up with Jerald Fenton MD.    Specialty:  Family Practice    Why:  for recheck in 7-10 days    Contact information:    [address] [place] [id number] [phone]          Discharge Instructions       Please read and follow the handout(s) instructions. Return, if needed, for increased or worsening symptoms and as directed by the handout(s).    I hope you feel better soon!    Electronically signed, Armando Deleon DO      Discharge References/Attachments     RUPTURED EARDRUM (ENGLISH)    PHARYNGITIS, STREP (CONFIRMED) (ENGLISH)      24 Hour Appointment Hotline       To make an appointment at any [facility] clinic, call [phone]). If you don't have a family doctor or clinic, we will help you find one. [facility] clinics are conveniently located to serve the needs of you and your family.             Review of your medicines      START taking        Dose / Directions Last dose taken    acetaminophen 500 MG tablet   Commonly known as:  TYLENOL   Dose:  500-1000 mg        Take 1-2 tablets (500-1,000 mg) by mouth every 6 hours as needed   Refills:  0        azithromycin 250 MG tablet   Commonly known as:  ZITHROMAX   Dose:  500 mg   Quantity:  8 tablet        Take 2 tablets (500 mg) by mouth daily for 4 days   Refills:  0        ibuprofen 200 MG tablet   Commonly known as:  ADVIL/MOTRIN    Dose:  600 mg        Take 3 tablets (600 mg) by mouth every 6 hours as needed for pain or fever (TAKE WITH FOOD) TAKE WITH FOOD AS NEEDED FOR PAIN   Refills:  0          Our records show that you are taking the medicines listed below. If these are incorrect, please call your family doctor or clinic.        Dose / Directions Last dose taken    norgestim-eth estrad triphasic 0.18/0.215/0.25 MG-35 MCG per tablet   Commonly known as:  TRI-LINYAH   Dose:  1 tablet   Quantity:  84 tablet        Take 1 tablet by mouth daily   Refills:  4                Prescriptions were sent or printed at these locations (3 Prescriptions)                   [facility] [place], [place] - [address]    [address] [facility] [address]    Telephone:  [phone]   Fax:  [phone]   Hours:                  E-Prescribed (1 of 3)         azithromycin (ZITHROMAX) 250 MG tablet                 Not Printed or Sent (2 of 3)         acetaminophen (TYLENOL) 500 MG tablet               ibuprofen (ADVIL/MOTRIN) 200 MG tablet                Procedures and tests performed during your visit     Rapid strep screen      Orders Needing Specimen Collection     None      Pending Results     No orders found from 4/26/2018 to 4/29/2018.            Pending Culture Results     No orders found from 4/26/2018 to 4/29/2018.            Pending Results Instructions     If you had any lab results that were not finalized at the time of your Discharge, you can call the ED Lab Result RN at [phone]. You will be contacted by this team for any positive Lab results or changes in treatment. The nurses are available 7 days a week from 10A to 6:30P.  You can leave a message 24 hours per day and they will return your call.        Thank you for choosing [facility]       Thank you for choosing [facility] for your care. Our goal is always to provide you with excellent care. Hearing back from our patients is one way we can continue to improve our services. Please  take a few minutes to complete the written survey that you may receive in the mail after you visit with us. Thank you!        [employer] Information     [employer] gives you secure access to your electronic health record. If you see a primary care provider, you can also send messages to your care team and make appointments. If you have questions, please call your primary care clinic.  If you do not have a primary care provider, please call [phone] and they will assist you.        Care EveryWhere ID     This is your Care EveryWhere ID. This could be used by other organizations to access your [facility] medical records  [id number]        Equal Access to Services     DINO [facility] : Stef Putnam, qi quiñones, ivet rondon, earnest johns. So [facility] [phone].    ATENCIÓN: Si habla español, tiene a abarca disposición servicios gratuitos de asistencia lingüística. Leilaniame al [phone].    We comply with applicable federal civil rights laws and [place] laws. We do not discriminate on the basis of race, color, national origin, age, disability, sex, sexual orientation, or gender identity.            After Visit Summary       This is your record. Keep this with you and show to your community pharmacist(s) and doctor(s) at your next visit.

## 2018-04-29 ASSESSMENT — ENCOUNTER SYMPTOMS: SORE THROAT: 1

## 2018-04-29 NOTE — ED PROVIDER NOTES
History     Chief Complaint   Patient presents with     Pharyngitis     HPI  Martin Daigle is a 33 year old female who presented to the clinic stating complaints of sore throat pain and right-sided ear fullness and decreased hearing.  Patient states that she has had a long history for recurrent episodes of ear infections and has had multiple ear tube placements in the past but none for many years.  She states the sore throat symptoms and ear fullness started yesterday afternoon and are becoming more severe.    Problem List:    Patient Active Problem List    Diagnosis Date Noted     CARDIOVASCULAR SCREENING; LDL GOAL LESS THAN 160 2011     Priority: Medium        Past Medical History:    Past Medical History:   Diagnosis Date     ABNORM ELECTROCARDIOGRAM 2001     ANXIETY STATE NOS 2001     Hx maternal GBS (group B streptococcus) affected , pregnant 2/3/2016     Syncope and collapse 2001     Tinea versicolor 2010       Past Surgical History:    Past Surgical History:   Procedure Laterality Date     C NONSPECIFIC PROCEDURE      release trigger finger right hand     HC CREATE EARDRUM OPENING,GEN ANESTH      P.E. Tubes x 2       Family History:    Family History   Problem Relation Age of Onset     Hypertension Father      DIABETES Father      Diet controlled? Not insulin dependent      OSTEOPOROSIS Maternal Grandmother      HEART DISEASE Maternal Grandfather      cardiac sugery     DIABETES Paternal Grandfather      Insulin Dependent     CEREBROVASCULAR DISEASE Paternal Grandfather      Allergies Sister        Social History:  Marital Status:   [2]  Social History   Substance Use Topics     Smoking status: Never Smoker     Smokeless tobacco: Never Used     Alcohol use No        Medications:      acetaminophen (TYLENOL) 500 MG tablet   azithromycin (ZITHROMAX) 250 MG tablet   ibuprofen (ADVIL/MOTRIN) 200 MG tablet   norgestim-eth estrad triphasic (TRI-LINYAH) 0.18/0.215/0.25  MG-35 MCG per tablet         Review of Systems   HENT: Positive for congestion, ear pain (right) and sore throat.    All other systems reviewed and are negative.      Physical Exam   BP: 128/74  Pulse: 100  Temp: 98.9  F (37.2  C)  Resp: 18  SpO2: 98 %      Physical Exam   Constitutional: She appears well-developed and well-nourished. She appears distressed.   HENT:   Right Ear: There is drainage (Serous). Tympanic membrane is perforated (Large perforation noted in the eardrum.).   Left Ear: Tympanic membrane, external ear and ear canal normal.   Nose: Rhinorrhea present.   Mouth/Throat: Posterior oropharyngeal erythema present. No oropharyngeal exudate.   Nursing note and vitals reviewed.      ED Course     ED Course     Procedures               Critical Care time:  none               No results found for this or any previous visit (from the past 24 hour(s)).    Medications   azithromycin (ZITHROMAX) tablet 500 mg (500 mg Oral Given 4/28/18 0355)       Assessments & Plan (with Medical Decision Making)  Patient with exam findings consistent with acute strep pharyngitis as well as a perforation to her right tympanic membrane likely secondary to a serous otitis media.  Patient initiated on azithromycin secondary to her history of penicillin allergy to treat the strep pharyngitis.  She is treated at the high dose for the 5 day course.  I have encouraged her to be rechecked in her clinic in regards to the perforated tympanic membrane to make sure that it heals over.  I am somewhat concerned because of the size of the perforation that she may need a patch to repair the perforation and may need referral to ENT and I discussed this with her.     I have reviewed the nursing notes.    I have reviewed the findings, diagnosis, plan and need for follow up with the patient.       Discharge Medication List as of 4/28/2018  3:56 AM      START taking these medications    Details   acetaminophen (TYLENOL) 500 MG tablet Take 1-2  tablets (500-1,000 mg) by mouth every 6 hours as needed, R-0, OTC      azithromycin (ZITHROMAX) 250 MG tablet Take 2 tablets (500 mg) by mouth daily for 4 days, Disp-8 tablet, R-0, E-Prescribe      ibuprofen (ADVIL/MOTRIN) 200 MG tablet Take 3 tablets (600 mg) by mouth every 6 hours as needed for pain or fever (TAKE WITH FOOD) TAKE WITH FOOD AS NEEDED FOR PAIN, R-0, OTC                  I verbally discussed the findings of the evaluation today in the ER. I have verbally discussed with Martin the suggested treatment(s) as described in the discharge instructions and handouts. I have prescribed the above listed medications and instructed her on appropriate use of these medications.      I have verbally suggested she follow-up in her clinic or return to the ER for increased symptoms. See the follow-up recommendations documented  in the after visit summary in this visit's EPIC chart.    Final diagnoses:   Streptococcal sore throat   Throat pain   Acute ear pain, right   Otitis media, serous, tm rupture, right       4/28/2018   [facility] EMERGENCY DEPARTMENT     Armando Deleon,   04/29/18 0645

## 2018-05-18 ENCOUNTER — OFFICE VISIT (OUTPATIENT)
Dept: FAMILY MEDICINE | Facility: CLINIC | Age: 33
End: 2018-05-18
Payer: COMMERCIAL

## 2018-05-18 VITALS
OXYGEN SATURATION: 100 % | SYSTOLIC BLOOD PRESSURE: 100 MMHG | HEIGHT: 62 IN | BODY MASS INDEX: 30.36 KG/M2 | RESPIRATION RATE: 18 BRPM | DIASTOLIC BLOOD PRESSURE: 62 MMHG | WEIGHT: 165 LBS | HEART RATE: 106 BPM | TEMPERATURE: 98.2 F

## 2018-05-18 DIAGNOSIS — G44.219 EPISODIC TENSION-TYPE HEADACHE, NOT INTRACTABLE: ICD-10-CM

## 2018-05-18 DIAGNOSIS — H72.91 PERFORATION OF RIGHT TYMPANIC MEMBRANE: ICD-10-CM

## 2018-05-18 DIAGNOSIS — F41.9 ANXIETY: Primary | ICD-10-CM

## 2018-05-18 PROCEDURE — 99214 OFFICE O/P EST MOD 30 MIN: CPT | Performed by: FAMILY MEDICINE

## 2018-05-18 ASSESSMENT — ANXIETY QUESTIONNAIRES
3. WORRYING TOO MUCH ABOUT DIFFERENT THINGS: NEARLY EVERY DAY
1. FEELING NERVOUS, ANXIOUS, OR ON EDGE: NEARLY EVERY DAY
6. BECOMING EASILY ANNOYED OR IRRITABLE: NEARLY EVERY DAY
2. NOT BEING ABLE TO STOP OR CONTROL WORRYING: NEARLY EVERY DAY
IF YOU CHECKED OFF ANY PROBLEMS ON THIS QUESTIONNAIRE, HOW DIFFICULT HAVE THESE PROBLEMS MADE IT FOR YOU TO DO YOUR WORK, TAKE CARE OF THINGS AT HOME, OR GET ALONG WITH OTHER PEOPLE: NOT DIFFICULT AT ALL
GAD7 TOTAL SCORE: 21
5. BEING SO RESTLESS THAT IT IS HARD TO SIT STILL: NEARLY EVERY DAY
7. FEELING AFRAID AS IF SOMETHING AWFUL MIGHT HAPPEN: NEARLY EVERY DAY

## 2018-05-18 ASSESSMENT — PATIENT HEALTH QUESTIONNAIRE - PHQ9: 5. POOR APPETITE OR OVEREATING: NEARLY EVERY DAY

## 2018-05-18 ASSESSMENT — PAIN SCALES - GENERAL: PAINLEVEL: NO PAIN (0)

## 2018-05-18 NOTE — NURSING NOTE
"Estimated body mass index is 30.18 kg/(m^2) as calculated from the following:    Height as of this encounter: 5' 2\" (1.575 m).    Weight as of this encounter: 165 lb (74.8 kg).  BP Readings from Last 1 Encounters:   05/18/18 100/62   ]  BP cuff size:  regular  Do you feel safe in your environment?  Yes  Does the patient need any medication refills today? no    "

## 2018-05-18 NOTE — MR AVS SNAPSHOT
"              After Visit Summary   5/18/2018    Martin Daigle    MRN: [id number]           Patient Information     Date Of Birth          1985        Visit Information        Provider Department      5/18/2018 1:30 PM Jerald Fenton MD [facility]         Follow-ups after your visit        Who to contact     If you have questions or need follow up information about today's clinic visit or your schedule please contact [facility] directly at [phone].  Normal or non-critical lab and imaging results will be communicated to you by MyChart, letter or phone within 4 business days after the clinic has received the results. If you do not hear from us within 7 days, please contact the clinic through [employer] or phone. If you have a critical or abnormal lab result, we will notify you by phone as soon as possible.  Submit refill requests through [employer] or call your pharmacy and they will forward the refill request to us. Please allow 3 business days for your refill to be completed.          Additional Information About Your Visit        MyChart Information     [employer] gives you secure access to your electronic health record. If you see a primary care provider, you can also send messages to your care team and make appointments. If you have questions, please call your primary care clinic.  If you do not have a primary care provider, please call [phone] and they will assist you.        Care EveryWhere ID     This is your Care EveryWhere ID. This could be used by other organizations to access your [facility] medical records  [id number]        Your Vitals Were     Pulse Temperature Respirations Height Last Period Pulse Oximetry    106 98.2  F (36.8  C) (Temporal) 18 5' 2\" (1.575 m) 05/12/2018 100%    Breastfeeding? BMI (Body Mass Index)                No 30.18 kg/m2           Blood Pressure from Last 3 Encounters:   05/18/18 100/62   04/28/18 128/74   04/09/18 132/85    Weight " from Last 3 Encounters:   05/18/18 165 lb (74.8 kg)   10/27/17 163 lb (73.9 kg)   01/17/17 166 lb (75.3 kg)              Today, you had the following     No orders found for display       Primary Care Provider Office Phone # Fax #    Jerald Fenton [phone] [address] [place] [address]        Equal Access to Services     DINO MCCALLUM : Hadii aad ku hadasho Soomaali, waaxda luqadaha, qaybta kaalmada adeegyada, waxay idiin hayaan adejaun kharash layolie . So [facility] [phone].    ATENCIÓN: Si habla español, tiene a abarca disposición servicios gratuitos de asistencia lingüística. Llame al [phone].    We comply with applicable federal civil rights laws and [place] laws. We do not discriminate on the basis of race, color, national origin, age, disability, sex, sexual orientation, or gender identity.            Thank you!     Thank you for choosing [facility]  for your care. Our goal is always to provide you with excellent care. Hearing back from our patients is one way we can continue to improve our services. Please take a few minutes to complete the written survey that you may receive in the mail after your visit with us. Thank you!             Your Updated Medication List - Protect others around you: Learn how to safely use, store and throw away your medicines at www.disposemymeds.org.          This list is accurate as of 5/18/18  4:36 PM.  Always use your most recent med list.                   Brand Name Dispense Instructions for use Diagnosis    norgestim-eth estrad triphasic 0.18/0.215/0.25 MG-35 MCG per tablet    TRI-LINYAH    84 tablet    Take 1 tablet by mouth daily    Encounter for surveillance of contraceptive pills

## 2018-05-19 ASSESSMENT — ANXIETY QUESTIONNAIRES: GAD7 TOTAL SCORE: 21

## 2018-05-19 ASSESSMENT — PATIENT HEALTH QUESTIONNAIRE - PHQ9: SUM OF ALL RESPONSES TO PHQ QUESTIONS 1-9: 18

## 2018-06-03 PROBLEM — H72.91 PERFORATION OF RIGHT TYMPANIC MEMBRANE: Status: ACTIVE | Noted: 2018-06-03

## 2018-06-10 ENCOUNTER — NURSE TRIAGE (OUTPATIENT)
Dept: NURSING | Facility: CLINIC | Age: 33
End: 2018-06-10

## 2018-06-10 ENCOUNTER — MYC MEDICAL ADVICE (OUTPATIENT)
Dept: FAMILY MEDICINE | Facility: CLINIC | Age: 33
End: 2018-06-10

## 2018-06-10 NOTE — TELEPHONE ENCOUNTER
Patient states she just noticed a lump behind her left ear at hairline; approximate size of dime/nickel.  Denies itching; doesn't remember bumping area or being bitten by a bug.  States only hurts if applies pressure.  Patient will monitor and call for appointment tomorrow if persists.    Reason for Disposition    [1] Swelling is painful to touch AND [2] no fever    Additional Information    Negative: Small growth, spot, bump, or pigmented area of skin (e.g., moles, skin tags, wart, melanoma, skin cancer)    Negative: Inguinal hernia previously diagnosed by a physician    Negative: Followed a skin injury    Negative: Follows an insect bite    Negative: Swelling of lymph node suspected    Negative: Swelling of vaccination site    Negative: Swelling of tongue    Negative: Swelling of lip    Negative: Swelling of eye    Negative: Swelling of entire face    Negative: Swelling of scrotum    Negative: Swelling of labia    Negative: Swelling of surgical incision    Negative: Swelling of ankle joint    Negative: Swelling of elbow joint    Negative: Swelling of knee joint    Negative: Swelling with a skin rash    Negative: Sounds like a life-threatening emergency to the triager    Negative: Patient sounds very sick or weak to the triager    Negative: SEVERE pain (e.g., excruciating)    Negative: [1] Swelling is painful to touch AND [2] fever    Negative: [1] Swelling is red AND [2] fever    Negative: [1] Swelling is red AND [2] size > 2 inches (5.0 cm) (Exception: itchy area of skin)    Negative: [1] Swelling of groin (inguinal area) AND [2] painful    Protocols used: SKIN LUMP OR LOCALIZED SWELLING-ADULT-

## 2018-06-11 ENCOUNTER — HOSPITAL ENCOUNTER (EMERGENCY)
Facility: CLINIC | Age: 33
Discharge: HOME | End: 2018-06-11
Attending: PHYSICIAN ASSISTANT | Admitting: PHYSICIAN ASSISTANT
Payer: COMMERCIAL

## 2018-06-11 VITALS
TEMPERATURE: 98.4 F | OXYGEN SATURATION: 98 % | DIASTOLIC BLOOD PRESSURE: 71 MMHG | HEIGHT: 62 IN | BODY MASS INDEX: 30.36 KG/M2 | SYSTOLIC BLOOD PRESSURE: 132 MMHG | WEIGHT: 165 LBS | HEART RATE: 78 BPM | RESPIRATION RATE: 16 BRPM

## 2018-06-11 DIAGNOSIS — R59.0 POSTERIOR AURICULAR LYMPHADENOPATHY: ICD-10-CM

## 2018-06-11 PROCEDURE — 99283 EMERGENCY DEPT VISIT LOW MDM: CPT | Mod: Z6 | Performed by: PHYSICIAN ASSISTANT

## 2018-06-11 PROCEDURE — 99282 EMERGENCY DEPT VISIT SF MDM: CPT | Performed by: PHYSICIAN ASSISTANT

## 2018-06-11 RX ORDER — DIAZEPAM 2 MG
2 TABLET ORAL ONCE
Status: DISCONTINUED | OUTPATIENT
Start: 2018-06-11 | End: 2018-06-11

## 2018-06-11 NOTE — ED AVS SNAPSHOT
[facility] Emergency Department    [address] [place] [address]    Phone:  [phone]    Fax:  [phone]                                       Martin Daigle   MRN: [id number]    Department:  [facility] Emergency Department   Date of Visit:  6/11/2018           After Visit Summary Signature Page     I have received my discharge instructions, and my questions have been answered. I have discussed any challenges I see with this plan with the nurse or doctor.    ..........................................................................................................................................  Patient/Patient Representative Signature      ..........................................................................................................................................  Patient Representative Print Name and Relationship to Patient    ..................................................               ................................................  Date                                            Time    ..........................................................................................................................................  Reviewed by Signature/Title    ...................................................              ..............................................  Date                                                            Time

## 2018-06-11 NOTE — ED AVS SNAPSHOT
[facility] Emergency Department    [address] [place] [address]    Phone:  [phone]    Fax:  [phone]                                       Martin Daigle   MRN: [id number]    Department:  [facility] Emergency Department   Date of Visit:  6/11/2018           Patient Information     Date Of Birth          1985        Your diagnoses for this visit were:     Posterior auricular lymphadenopathy        You were seen by Veda Garcia PA-C.      Follow-up Information     Follow up with [facility] Emergency Department.    Specialty:  EMERGENCY MEDICINE    Why:  If symptoms worsen    Contact information:    [address] [place] [address] [phone]    Additional information:    From [address]: Exit at [address] on south side of [place]. Turn right on [address]. Turn left at stoplight on [address]. [facility] will be in view two blocks ahead        Follow up with Jerald Fenton MD In 1 week.    Specialty:  Family Practice    Why:  As needed for persistent symptoms    Contact information:    [address] [place] [address]  [phone]          Discharge Instructions       It looks like your lymph node is inflamed.  This can be due to many things but it should get better on its own within the next few days.  Use ibuprofen or Tylenol to control pain.  Apply ice and try not to poke at it.  Follow-up in 1-2 weeks if it is not any better.  Return to the emergency department as discussed for worsening symptoms.    Thank you for choosing [facility]'s Emergency Department. It was a pleasure taking care of you today. If you have any questions, please call [phone].    Veda Garcia PA-C        Lymphadenopathy  Lymphadenopathy is swelling of the lymph nodes. Lymph nodes are small, bean-shaped glands around the body.  What are lymph nodes?  Lymph nodes are part of your immune system. These glands are  found in your neck, over your clavicle, armpits, groin, chest, and abdomen. They act as filters for lymph fluid as it flows through your body. Lymph fluid contains white blood cells (lymphocytes) that help the body fight infection and disease.   Why lymph nodes swell  Lymphadenopathy is very common. The glands often enlarge during a viral or bacterial infection. It can happen during a cold, the flu, or strep throat. The nodes may swell in just one area of the body, such as the neck (localized). Or nodes may swell all over the body (generalized). The neck (cervical) lymph nodes are the most common site of lymphadenopathy.  What causes lymphadenopathy?  Dead cells and fluid build up in the lymph nodes as they help fight infection or disease. This causes them to swell in size. Enlarged lymph nodes are often near the source of infection. This can help to find the cause of an infection. For example, swollen lymph nodes around the jaw may be because of an infection in the teeth or mouth. But lymphadenopathy may also be generalized. This is common in some viral illnesses such as infectious mononucleosis or chickenpox (varicella).  Lymphadenopathy can also be caused by:    Infection of a lymph node or small group of nodes (lymphadenitis)    Cancer    Reactions to medicines such as antibiotics and certain blood pressure, gout, and seizure medicines    Other health conditions, such as HIV infection, lupus, or sarcoidosis  Symptoms of lymphadenopathy  Lymphadenopathy can cause symptoms such as:    Lumps under the jaw, on the sides or back of the neck, in the armpits, in the groin, or in the chest or belly (abdomen)    Pain or tenderness in any of these areas    Redness or warmth in any of these areas  You may also have symptoms from an infection causing the swollen glands. These symptoms may include fever, sore throat, body aches, or cough.  Diagnosing lymphadenopathy  Your healthcare provider will ask about your health  history and symptoms. He or she will give you a physical exam and check the areas where lymph nodes are enlarged. Your healthcare provider will check the size and location of the nodes, and ask how long they have been swollen and if they are painful. Diagnostic tests and referral to specialists may be recommended. They may include:    Blood tests. These are done to check for signs of infection and other problems.    Urine test. This is also done to check for infection and other problems.    Chest X-ray, ultrasound, CT scan, or MRI scans. These tests can show enlarged lymph nodes or other problems.    Lymph node biopsy. If lymph nodes are swollen for 3 to 4 weeks, they may be checked with a biopsy. Small samples of lymph node tissue are taken and checked in a lab for signs of cancer. You may be referred to a specialist in blood disorders and cancer (hematologist and oncologist).  Treatment for lymphadenopathy  The treatment of enlarged lymph nodes depends on the cause. Enlarged lymph nodes are often harmless and go away without any treatment. Treatment is most often done on the cause of the enlarged nodes and may include:    Antibiotic medicine to treat a bacterial infection    Incision and drainage of a lymph node for lymphadenitis    Other medicines or procedures to treat the cause of the enlarged nodes  You may need follow-up exam in 3 to 4 weeks to recheck enlarged nodes.     When to call your healthcare provider  Call your healthcare provider if you have lymph nodes that are still swollen after 3 to 4 weeks, or as directed by your healthcare provider.         Your next 10 appointments already scheduled     Jun 12, 2018  2:00 PM CDT   SHORT with Jerald Fenton MD   [facility] ([address] [address]   [phone]            Raghavendra 15, 2018 10:30 AM CDT   SHORT with Jerald Fenton MD   [facility] ([facility]  [place] [address] [facility] [address] [phone]              24 Hour Appointment Hotline       To make an appointment at any [facility] clinic, call [phone] ([phone]). If you don't have a family doctor or clinic, we will help you find one. [facility] clinics are conveniently located to serve the needs of you and your family.             Review of your medicines      Notice     You have not been prescribed any medications.            Orders Needing Specimen Collection     None      Pending Results     No orders found from 6/9/2018 to 6/12/2018.            Pending Culture Results     No orders found from 6/9/2018 to 6/12/2018.            Pending Results Instructions     If you had any lab results that were not finalized at the time of your Discharge, you can call the ED Lab Result RN at [phone]. You will be contacted by this team for any positive Lab results or changes in treatment. The nurses are available 7 days a week from 10A to 6:30P.  You can leave a message 24 hours per day and they will return your call.        Thank you for choosing [facility]       Thank you for choosing [facility] for your care. Our goal is always to provide you with excellent care. Hearing back from our patients is one way we can continue to improve our services. Please take a few minutes to complete the written survey that you may receive in the mail after you visit with us. Thank you!        [employer] Information     [employer] gives you secure access to your electronic health record. If you see a primary care provider, you can also send messages to your care team and make appointments. If you have questions, please call your primary care clinic.  If you do not have a primary care provider, please call [phone] and they will assist you.        Care EveryWhere ID     This is your Care EveryWhere ID. This could be used by other organizations to access your [facility] medical records  [id number]         Equal Access to Services     DINO MCCALLUM : Stef Putnam, qi quiñones, earnest finn. So [facility] [phone].    ATENCIÓN: Si habla español, tiene a abarca disposición servicios gratuitos de asistencia lingüística. Llame al [phone].    We comply with applicable federal civil rights laws and [place] laws. We do not discriminate on the basis of race, color, national origin, age, disability, sex, sexual orientation, or gender identity.            After Visit Summary       This is your record. Keep this with you and show to your community pharmacist(s) and doctor(s) at your next visit.

## 2018-06-11 NOTE — TELEPHONE ENCOUNTER
Patient calling requesting a call back from Yanira.  Thank you,  Jia Lucero  Patient Representative

## 2018-06-11 NOTE — TELEPHONE ENCOUNTER
This is something that should be at least looked at but is not an emergency.  I do not have anything technically open for her to schedule herself, so I will need to have staff contact her to set her up in a doctor only slot.  Anytime this week would be fine, and if she is not super worried next week would be okay to.    Jerald Fenton MD

## 2018-06-12 ENCOUNTER — MYC MEDICAL ADVICE (OUTPATIENT)
Dept: FAMILY MEDICINE | Facility: CLINIC | Age: 33
End: 2018-06-12

## 2018-06-12 NOTE — ED TRIAGE NOTES
Patient presents with complaints of lump behind L) ear for the past couple days. She reports increased pain today, no fever. States headaches over the weekend. Hansa Mccann RN

## 2018-06-12 NOTE — ED PROVIDER NOTES
History     Chief Complaint   Patient presents with     Painful lump behind L) ear     HPI  Martin Daigle is a 33 year old female who presents to the emergency department complaining of a painful lump behind her left ear. Patient reports yesterday she noticed a lump behind her left ear.  She has one behind her right ear as well but the left one seemed a bit tender.  The discomfort seemed to go away by last night but then today she realized it was really sore.  It has progressively gotten more painful throughout the course of the day.  Once in a while the pain radiates into her scalp or down her neck.  She denies associated fever or chills, body aches, ear pain, throat pain, cough or any other symptoms.        Problem List:    Patient Active Problem List    Diagnosis Date Noted     Perforation of right tympanic membrane 2018     Priority: Medium     CARDIOVASCULAR SCREENING; LDL GOAL LESS THAN 160 2011     Priority: Medium        Past Medical History:    Past Medical History:   Diagnosis Date     ABNORM ELECTROCARDIOGRAM 2001     ANXIETY STATE NOS 2001     Hx maternal GBS (group B streptococcus) affected , pregnant 2/3/2016     Syncope and collapse 2001     Tinea versicolor 2010       Past Surgical History:    Past Surgical History:   Procedure Laterality Date     C NONSPECIFIC PROCEDURE      release trigger finger right hand     HC CREATE EARDRUM OPENING,GEN ANESTH      P.E. Tubes x 2       Family History:    Family History   Problem Relation Age of Onset     Hypertension Father      DIABETES Father      Diet controlled? Not insulin dependent      OSTEOPOROSIS Maternal Grandmother      HEART DISEASE Maternal Grandfather      cardiac sugery     DIABETES Paternal Grandfather      Insulin Dependent     CEREBROVASCULAR DISEASE Paternal Grandfather      Allergies Sister        Social History:  Marital Status:   [2]  Social History   Substance Use Topics     Smoking  "status: Never Smoker     Smokeless tobacco: Never Used     Alcohol use No        Medications:      No current outpatient prescriptions on file.      Review of Systems   All other systems reviewed and are negative.      Physical Exam   BP: 132/71  Pulse: 78  Temp: 98.4  F (36.9  C)  Resp: 16  Height: 157.5 cm (5' 2\")  Weight: 74.8 kg (165 lb)  SpO2: 98 %      Physical Exam   Constitutional: She is oriented to person, place, and time. She appears well-developed and well-nourished. No distress.   HENT:   Head: Normocephalic and atraumatic.   Right Ear: External ear normal. Tympanic membrane is perforated (patient states is chronic). Tympanic membrane is not injected.   Left Ear: External ear normal. Tympanic membrane is scarred. Tympanic membrane is not injected.   Mouth/Throat: Oropharynx is clear and moist. No oropharyngeal exudate.   Patient has bilateral postauricular lymphadenopathy.  Left lymph node is tender to palpation but there is no overlying erythema.  No fluctuant mass to suggest abscess.   Eyes: Conjunctivae are normal.   Neck: Normal range of motion.   Pulmonary/Chest: Effort normal. No respiratory distress.   Lymphadenopathy:     She has no cervical adenopathy.   Neurological: She is alert and oriented to person, place, and time.   Skin: Skin is warm and dry. She is not diaphoretic.   Psychiatric: She has a normal mood and affect.   Nursing note and vitals reviewed.      ED Course     ED Course     Procedures      No results found for this or any previous visit (from the past 24 hour(s)).    Medications - No data to display    Assessments & Plan (with Medical Decision Making)  Martin Daigle is a 33 year old female presented to the ED with a sore \"lump\" behind her left ear.  Developed in the last 2 days.  She has not taken anything for pain.  Denies any other symptoms.  On exam today she did have an inflamed postauricular lymph node on the left that was tender to palpation.  There was no evidence for " infection to explain this lymphadenopathy today.  No evidence of otitis media, no skin rashes.  No fluctuance to suggest abscess.  Discussed findings with the patient and provided reassurance that this is likely a self-limiting issue.  Advised use of Tylenol or ibuprofen for pain and ice to the affected area. Should follow-up in a week or 2 in the clinic if no improvement.  Advised returning to the ED for any acutely worsening symptoms.  Patient in agreement with plan and she was discharged home.     I have reviewed the nursing notes.    I have reviewed the findings, diagnosis, plan and need for follow up with the patient.       New Prescriptions    No medications on file       Final diagnoses:   Posterior auricular lymphadenopathy     Note: Chart documentation done in part with Dragon Voice Recognition software. Although reviewed after completion, some word and grammatical errors may remain.     6/11/2018   [facility] EMERGENCY DEPARTMENT     Veda Garcia PA-C  06/11/18 [id number]

## 2018-06-12 NOTE — DISCHARGE INSTRUCTIONS
It looks like your lymph node is inflamed.  This can be due to many things but it should get better on its own within the next few days.  Use ibuprofen or Tylenol to control pain.  Apply ice and try not to poke at it.  Follow-up in 1-2 weeks if it is not any better.  Return to the emergency department as discussed for worsening symptoms.    Thank you for choosing [facility] Emergency Department. It was a pleasure taking care of you today. If you have any questions, please call [phone].    Veda Garcia PA-C        Lymphadenopathy  Lymphadenopathy is swelling of the lymph nodes. Lymph nodes are small, bean-shaped glands around the body.  What are lymph nodes?  Lymph nodes are part of your immune system. These glands are found in your neck, over your clavicle, armpits, groin, chest, and abdomen. They act as filters for lymph fluid as it flows through your body. Lymph fluid contains white blood cells (lymphocytes) that help the body fight infection and disease.   Why lymph nodes swell  Lymphadenopathy is very common. The glands often enlarge during a viral or bacterial infection. It can happen during a cold, the flu, or strep throat. The nodes may swell in just one area of the body, such as the neck (localized). Or nodes may swell all over the body (generalized). The neck (cervical) lymph nodes are the most common site of lymphadenopathy.  What causes lymphadenopathy?  Dead cells and fluid build up in the lymph nodes as they help fight infection or disease. This causes them to swell in size. Enlarged lymph nodes are often near the source of infection. This can help to find the cause of an infection. For example, swollen lymph nodes around the jaw may be because of an infection in the teeth or mouth. But lymphadenopathy may also be generalized. This is common in some viral illnesses such as infectious mononucleosis or chickenpox (varicella).  Lymphadenopathy can also be caused by:    Infection of a  lymph node or small group of nodes (lymphadenitis)    Cancer    Reactions to medicines such as antibiotics and certain blood pressure, gout, and seizure medicines    Other health conditions, such as HIV infection, lupus, or sarcoidosis  Symptoms of lymphadenopathy  Lymphadenopathy can cause symptoms such as:    Lumps under the jaw, on the sides or back of the neck, in the armpits, in the groin, or in the chest or belly (abdomen)    Pain or tenderness in any of these areas    Redness or warmth in any of these areas  You may also have symptoms from an infection causing the swollen glands. These symptoms may include fever, sore throat, body aches, or cough.  Diagnosing lymphadenopathy  Your healthcare provider will ask about your health history and symptoms. He or she will give you a physical exam and check the areas where lymph nodes are enlarged. Your healthcare provider will check the size and location of the nodes, and ask how long they have been swollen and if they are painful. Diagnostic tests and referral to specialists may be recommended. They may include:    Blood tests. These are done to check for signs of infection and other problems.    Urine test. This is also done to check for infection and other problems.    Chest X-ray, ultrasound, CT scan, or MRI scans. These tests can show enlarged lymph nodes or other problems.    Lymph node biopsy. If lymph nodes are swollen for 3 to 4 weeks, they may be checked with a biopsy. Small samples of lymph node tissue are taken and checked in a lab for signs of cancer. You may be referred to a specialist in blood disorders and cancer (hematologist and oncologist).  Treatment for lymphadenopathy  The treatment of enlarged lymph nodes depends on the cause. Enlarged lymph nodes are often harmless and go away without any treatment. Treatment is most often done on the cause of the enlarged nodes and may include:    Antibiotic medicine to treat a bacterial infection    Incision  and drainage of a lymph node for lymphadenitis    Other medicines or procedures to treat the cause of the enlarged nodes  You may need follow-up exam in 3 to 4 weeks to recheck enlarged nodes.     When to call your healthcare provider  Call your healthcare provider if you have lymph nodes that are still swollen after 3 to 4 weeks, or as directed by your healthcare provider.

## 2018-06-15 ENCOUNTER — TELEPHONE (OUTPATIENT)
Dept: BEHAVIORAL HEALTH | Facility: CLINIC | Age: 33
End: 2018-06-15

## 2018-06-15 ENCOUNTER — OFFICE VISIT (OUTPATIENT)
Dept: FAMILY MEDICINE | Facility: CLINIC | Age: 33
End: 2018-06-15
Payer: COMMERCIAL

## 2018-06-15 VITALS
SYSTOLIC BLOOD PRESSURE: 116 MMHG | BODY MASS INDEX: 30.18 KG/M2 | WEIGHT: 164 LBS | DIASTOLIC BLOOD PRESSURE: 72 MMHG | HEIGHT: 62 IN | RESPIRATION RATE: 18 BRPM | TEMPERATURE: 98.5 F | HEART RATE: 102 BPM | OXYGEN SATURATION: 98 %

## 2018-06-15 DIAGNOSIS — F41.9 ANXIETY: ICD-10-CM

## 2018-06-15 DIAGNOSIS — R59.9 ENLARGED LYMPH NODE: Primary | ICD-10-CM

## 2018-06-15 PROCEDURE — 99213 OFFICE O/P EST LOW 20 MIN: CPT | Performed by: FAMILY MEDICINE

## 2018-06-15 ASSESSMENT — ANXIETY QUESTIONNAIRES
5. BEING SO RESTLESS THAT IT IS HARD TO SIT STILL: MORE THAN HALF THE DAYS
GAD7 TOTAL SCORE: 14
1. FEELING NERVOUS, ANXIOUS, OR ON EDGE: MORE THAN HALF THE DAYS
6. BECOMING EASILY ANNOYED OR IRRITABLE: MORE THAN HALF THE DAYS
3. WORRYING TOO MUCH ABOUT DIFFERENT THINGS: MORE THAN HALF THE DAYS
7. FEELING AFRAID AS IF SOMETHING AWFUL MIGHT HAPPEN: MORE THAN HALF THE DAYS
IF YOU CHECKED OFF ANY PROBLEMS ON THIS QUESTIONNAIRE, HOW DIFFICULT HAVE THESE PROBLEMS MADE IT FOR YOU TO DO YOUR WORK, TAKE CARE OF THINGS AT HOME, OR GET ALONG WITH OTHER PEOPLE: NOT DIFFICULT AT ALL
2. NOT BEING ABLE TO STOP OR CONTROL WORRYING: MORE THAN HALF THE DAYS

## 2018-06-15 ASSESSMENT — PAIN SCALES - GENERAL: PAINLEVEL: MODERATE PAIN (5)

## 2018-06-15 ASSESSMENT — PATIENT HEALTH QUESTIONNAIRE - PHQ9: 5. POOR APPETITE OR OVEREATING: MORE THAN HALF THE DAYS

## 2018-06-15 NOTE — PROGRESS NOTES
SUBJECTIVE:   Martin Daigle is a 33 year old female who presents to clinic today for the following health issues:      ED/UC Followup:    Facility:  [facility]  Date of visit: 6/11/2018  Reason for visit: lymph node   Current Status: still sore to touch.  Left side behind ear, by hairline.              Problem list and histories reviewed & adjusted, as indicated.  Additional history: as documented        Reviewed and updated as needed this visit by clinical staff  Tobacco  Allergies  Meds  Problems       Reviewed and updated as needed this visit by Provider  Allergies  Meds  Problems         Patient here today for evaluation of enlarged lymph node in the left occiput area.  She was seen in the emergency room 4 days ago and it was noted that it was not consistent with an abscess or concerning infection.  There is no overlying erythema or discharge from the lesion.  Patient went to the ER also for pain management as the pain got quite severe.  She notes that over the past few days since her ER visit, her pain symptoms have improved.    Patient notes that she does excessively touch the area in question ever since she found it, as it has been quite concerning to her.    Patient denies fevers, chills, night sweats, recent weight changes, fatigue.  Patient does note that she has been quite anxious again.      I saw her about a month ago for anxiety, and at that time we discussed simply watching her symptoms and referring her to Debbie Alanis, our behavioral health clinician, if her symptoms have not improved.  Patient still is not interested in medications but does note that she felt better when she spoke with me month ago and also has felt better when she is talked to her mom during her higher levels of anxiety.    ROS:  10 point ROS of systems including Constitutional, Eyes, HENT, Respiratory, Cardiovascular, Gastroenterology, Genitourinary, Integumentary, Muscularskeletal, Psychiatric were all negative except for  "pertinent positives noted in my HPI.     OBJECTIVE:   /72  Pulse 102  Temp 98.5  F (36.9  C) (Temporal)  Resp 18  Ht 5' 2\" (1.575 m)  Wt 164 lb (74.4 kg)  SpO2 98%  Breastfeeding? No  BMI 30 kg/m2  Body mass index is 30 kg/(m^2).  Physical Exam   Constitutional: She appears well-developed and well-nourished.   Cardiovascular: Normal rate, regular rhythm, S1 normal, S2 normal and normal heart sounds.    No murmur heard.  Pulmonary/Chest: Effort normal and breath sounds normal. No respiratory distress. She has no wheezes. She has no rhonchi. She has no rales.   Lymphadenopathy:        Head (right side): No posterior auricular and no occipital adenopathy present.        Head (left side): Posterior auricular (About 1 cm sized post auricular lymph node that is tender to palpation and minimally mobile.  No overlying erythema or discharge.) adenopathy present. No occipital adenopathy present.     She has no cervical adenopathy.        Right: No supraclavicular adenopathy present.        Left: No supraclavicular adenopathy present.   Neurological: She is alert.   Psychiatric: Her speech is normal and behavior is normal. Judgment normal. Her mood appears anxious. Cognition and memory are normal. She does not exhibit a depressed mood.       ASSESSMENT/PLAN:       ICD-10-CM    1. Enlarged lymph node left occiput R59.9    2. Anxiety F41.9 [facility] RE[facility]ERR[facility]     PLAN:  1.  I reassured patient today that given her pain has improved and she has no other corresponding symptoms, I am not worried that the enlarged lymph node is concerning of any other condition such as leukemia or other cancers.  Patient was reassured today.  We discussed that this enlarged lymph node may last for 1-2 months before completely resolving.  If her pain worsens, or if the lesion gets larger, she should follow-up for further evaluation.  I did instruct the patient not to manipulate the lesion more than 1-2 " times a week for assessment of its size and shape as multiple poking and prodding like she has done may have been what initially set off the swelling and the discomfort.    2.  Patient is interested in seeing Debbie Alanis, our behavioral health clinician for help with therapy tools to manage her anxiety symptoms.  I agree that she does not require medication management at this time, but she is invited to contact me if these needs change.  I discussed plan of care with Debbie Alanis and she will contact patient to set up an appointment.    Follow up with Provider -3-6 months for recheck on her anxiety.  She will be due for her annual well visit at that time.    Jerald Fenton MD   [facility]

## 2018-06-15 NOTE — MR AVS SNAPSHOT
After Visit Summary   6/15/2018    Martin Daigle    MRN: [id number]           Patient Information     Date Of Birth          1985        Visit Information        Provider Department      6/15/2018 10:30 AM Jerald Fenton MD [facility]        Today's Diagnoses     Enlarged lymph node left occiput    -  1    Anxiety           Follow-ups after your visit        Additional Services     [facility] REFERRAL       Services are provided by a Behavioral Health Clinican ([facility]) for [facility] patients' with co-occuring medical / behavioral health needs or mental health / substance use issues referred by Care Team Members (MTM and Care Coordinators)    Services can be provided in person / in clinic or telephonically for the [place]: [place], [place], Integrated Primary Care, and Complex Mobile Care Clinic patients.      Telephone / Consultation support can be provided to Care Team Members for [facility] patients.    C's will respond to routine orders within 3-5 business days.  C's will provide telephonic support to referred patients as indicated.    ~~~~~~~~~~~~~~~~~~~~~~~~~~~~~~~~~~~~~~~~~~~~~~~~~~~~~~~~~~~~~~~    Care Team Member creating referral: TC per Dr. Fenton's request    REFERRAL REASON:    Possible mental health issue causing distress or interfering with patient's treatment adherence and / or health management      Provide additional details for Behavioral Health Clinician to best meet patient's current needs: Anxiety    Clinic Staff has discussed Behavioral Health Clinician Referral with the Patient/Caregiver: yes                  Who to contact     If you have questions or need follow up information about today's clinic visit or your schedule please contact [facility] directly at [phone].  Normal or non-critical lab and imaging results will be communicated to you by MyChart, letter or phone within 4 business days after the  "clinic has received the results. If you do not hear from us within 7 days, please contact the clinic through [employer] or phone. If you have a critical or abnormal lab result, we will notify you by phone as soon as possible.  Submit refill requests through [employer] or call your pharmacy and they will forward the refill request to us. Please allow 3 business days for your refill to be completed.          Additional Information About Your Visit        [employer] Information     [employer] gives you secure access to your electronic health record. If you see a primary care provider, you can also send messages to your care team and make appointments. If you have questions, please call your primary care clinic.  If you do not have a primary care provider, please call [phone] and they will assist you.        Care EveryWhere ID     This is your Care EveryWhere ID. This could be used by other organizations to access your [facility] medical records  [id number]        Your Vitals Were     Pulse Temperature Respirations Height Pulse Oximetry Breastfeeding?    102 98.5  F (36.9  C) (Temporal) 18 5' 2\" (1.575 m) 98% No    BMI (Body Mass Index)                   30 kg/m2            Blood Pressure from Last 3 Encounters:   06/15/18 116/72   06/11/18 132/71   05/18/18 100/62    Weight from Last 3 Encounters:   06/15/18 164 lb (74.4 kg)   06/11/18 165 lb (74.8 kg)   05/18/18 165 lb (74.8 kg)              We Performed the Following     [facility] RE[facility]ERRAL        Primary Care Provider Office Phone # Fax #    Jerald Daniel Fenton [phone]       [address] [place] [address]        Equal Access to Services     [facility]: Hadii ruben Putnam, waaxda luqadaha, qaybta kaalearnest hardin. So [facility] [phone].    ATENCIÓN: Si habla español, tiene a abarca disposición servicios gratuitos de asistencia lingüística. Llame al " [phone].    We comply with applicable federal civil rights laws and [place] laws. We do not discriminate on the basis of race, color, national origin, age, disability, sex, sexual orientation, or gender identity.            Thank you!     Thank you for choosing [facility]  for your care. Our goal is always to provide you with excellent care. Hearing back from our patients is one way we can continue to improve our services. Please take a few minutes to complete the written survey that you may receive in the mail after your visit with us. Thank you!             Your Updated Medication List - Protect others around you: Learn how to safely use, store and throw away your medicines at www.disposemymeds.org.      Notice  As of 6/15/2018  1:48 PM    You have not been prescribed any medications.

## 2018-06-15 NOTE — TELEPHONE ENCOUNTER
Phone Encounter   [facility] spoke with patient, by PCP request. Introduced self and role. Patient scheduled an initial visit with this writer for 6/20/18.

## 2018-06-16 ASSESSMENT — PATIENT HEALTH QUESTIONNAIRE - PHQ9: SUM OF ALL RESPONSES TO PHQ QUESTIONS 1-9: 12

## 2018-06-16 ASSESSMENT — ANXIETY QUESTIONNAIRES: GAD7 TOTAL SCORE: 14

## 2018-06-20 ENCOUNTER — OFFICE VISIT (OUTPATIENT)
Dept: BEHAVIORAL HEALTH | Facility: CLINIC | Age: 33
End: 2018-06-20
Payer: COMMERCIAL

## 2018-06-20 DIAGNOSIS — F43.21 ADJUSTMENT DISORDER WITH DEPRESSED MOOD: ICD-10-CM

## 2018-06-20 DIAGNOSIS — F41.9 ANXIETY: Primary | ICD-10-CM

## 2018-06-20 PROCEDURE — 90791 PSYCH DIAGNOSTIC EVALUATION: CPT | Performed by: MARRIAGE & FAMILY THERAPIST

## 2018-06-20 NOTE — PROGRESS NOTES
[facility]  Integrated Behavioral Health Services   Diagnostic Assessment      PATIENT'S NAME: Martin Daigle  MRN:   [id number]  :   1985  DATE OF SERVICE: 2018  SERVICE LOCATION: Face to Face in Clinic  Visit Activities: NEW and [facility] Only      Identifying Information:  Patient is a 33 year old year old, ,  female.  Patient attended the session alone.        Referral:  Patient was referred for an assessment by Dr. Jerald Fenton MD at [facility].   Reason for referral: determine behavioral health treatment options.       Patient's Statement of Presenting Concern:  Patient reports the following reason(s) for seeking an assessment at this time: anxiety. Patient experienced multiple migraines over a month ago and will ruminate about the possibility of having more. Patient reports that when she starts to get a migraine her vision goes funny and she experiences a numbness in her arm, this leads to more worry as she has three little kids to care for. Patient reports that she hasn't been sleeping as well; she can sleep through the night, but doesn't feel rested or she will wake early and can't fall back to sleep. Patient stated that her symptoms have resulted in the following functional impairments: childcare / parenting and home life with spouse      History of Presenting Concern:  Patient reports that these problem(s) began a month and a half ago, though she states that her  has seen symptoms longer than that. Patient has not attempted to resolve these concerns in the past. Patient reports that other professional(s) are involved in providing support / services. Patient has been working closely with her PCP and he referred patient to this writer. Patient reports that a month and a half ago she had 4 migraines in a three week time period. She states that she worries about what if she were to have more. Patient had a pain  "in her neck last week and so patient started to see a chiropractor.      Social History:  Patient reported she grew up in [place], [place]. They were the first born of 3 children; she has twin siblings that are about three years younger.  Patient reported that her childhood was \"good\". She states that around ages [age] she and her mom went through a challenging time in their relationship, however they worked through it. There was a lot of loss at that time with grandparents dying.  Patient reported a history of 1 committed relationship or marriage. Patient has been  for 8 years. Patient reported having 3 children ages 5, 3 and almost 2. Patient identified some stable and meaningful social connections. She has one really good friend.    Patient lives with her  and their three kids and they all currently live with patient's mom and dad. They are in the process of starting to build a home and they have been living with her parents for almost 4 years.  Patient is currently employed part time.  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    Patient reported that she has not been involved with the legal system.  Patient's highest education level was high school graduate and college graduate. Patient did not identify any learning problems. There are no ethnic, cultural or Buddhist factors that may be relevant for therapy.  Patient did not serve in the .       Mental Health History:  Patient reported the following biological family members or relatives with mental health issues: Mother experienced anxiety symptoms and there has been no formal diagnosis. Patient has not received mental health services in the past. Patient is not currently receiving any mental health services.      Chemical Health History:  Patient reported no family history of chemical health issues. Patient has not received chemical dependency treatment in the past. Patient is not currently receiving " any chemical dependency treatment. Patient reports no problems as a result of their drinking / drug use.  Patient reports use of alcohol as once a week.  Patient denies use of cannabis and other illicit drugs.  Patient denies use of tobacco.  Patient denies use of caffeine.    Cage-AID score is: negative. Based on Cage-Aid score and clinical interview there  are not indications of drug or alcohol abuse.      Discussed the general effects of drugs and alcohol on health and well-being.      Significant Losses / Trauma / Abuse / Neglect Issues:  There are indications or report of significant loss, trauma, abuse or neglect issues related to: death of all four of her grandparents around age 15.    Issues of possible neglect are not present.      Medical History:   Patient Active Problem List   Diagnosis     CARDIOVASCULAR SCREENING; LDL GOAL LESS THAN 160     Perforation of right tympanic membrane       Medication Review:  No current outpatient prescriptions on file.     No current facility-administered medications for this visit.        Patient was provided recommendation to follow-up with physician.    Mental Status Assessment:  Appearance:   Appropriate   Eye Contact:   Good   Psychomotor Behavior: Normal   Attitude:   Cooperative   Orientation:   All  Speech   Rate / Production: Normal    Volume:  Normal   Mood:    Anxious   Affect:    Appropriate   Thought Content:  Clear   Thought Form:  Coherent  Logical   Insight:    Good       Safety Assessment:    Patient denies a history of suicidal ideation, suicide attempts, self-injurious behavior, homicidal ideation, homicidal behavior and and other safety concerns  Patient denies current or recent suicidal ideation or behaviors.  Patient denies current or recent homicidal ideation or behaviors.  Patient denies current or recent self injurious behavior or ideation.  Patient denies other safety concerns.  Patient reports there are firearms in the house. The firearms are  secured in a locked space  Protective Factors Children in the home , Sense of responsibility to family, Reality testing ability, Positive coping skills and Positive social support   Risk Factors Current high stress      Plan for Safety and Risk Management:  A safety and risk management plan has not been developed at this time, however patient was encouraged to call [facility] should there be a change in any of these risk factors.      Review of Symptoms:  Depression: Sleep Energy Concentration Ruminations  Jailyn:  No symptoms  Psychosis: No symptoms  Anxiety: Worries Nervousness  Panic:  Shortness of Breath Numbness  Post Traumatic Stress Disorder: No symptoms  Obsessive Compulsive Disorder: No symptoms  Eating Disorder: No symptoms  Oppositional Defiant Disorder: No symptoms  ADD / ADHD: No symptoms  Conduct Disorder: No symptoms    Patient's Strengths and Limitations:  Patient identified the following strengths or resources that will help her succeed in counseling: commitment to health and well being, family support, intelligence and positive work environment. Patient identified the following supports: family and friends. Things that may interfere with the patien'ts success in behavioral health services include:none identified.    Diagnostic Criteria:  Anxiety disorder is present, but at this time therapist is unable to determine whether it is primary.  Further assessment needed.   - Excessive anxiety and worry about a number of events or activities (such as work or school performance).    - The person finds it difficult to control the worry.   - Restlessness or feeling keyed up or on edge.    - Difficulty concentrating or mind going blank.    - Muscle tension.    - Sleep disturbance (difficulty falling or staying asleep, or restless unsatisfying sleep).    - The focus of the anxiety and worry is not confined to features of an Axis I disorder.   - The anxiety, worry, or physical symptoms cause clinically  significant distress or impairment in social, occupational, or other important areas of functioning.    - The disturbance is not due to the direct physiological effects of a substance (e.g., a drug of abuse, a medication) or a general medical condition (e.g., hyperthyroidism) and does not occur exclusively during a Mood Disorder, a Psychotic Disorder, or a Pervasive Developmental Disorder.    - The aformentioned symptoms began over 1 month(s) ago and occurs 7 days per week and is experienced as mild.  A. The development of emotional or behavioral symptoms in response to an identifiable stressor(s) occurring within 3 months of the onset of the stressor(s)  B. These symptoms or behaviors are clinically significant, as evidenced by one or both of the following:       - Marked distress that is out of proportion to the severity/intensity of the stressor (with consideration for external context & culture)  C. The stress-related disturbance does not meet criteria for another disorder & is not not an exacerbation of another mental disorder  D. The symptoms do not represent normal bereavement  E. Once the stressor or its consequences have terminated, the symptoms do not persist for more than an additional 6 months       * Adjustment Disorder with Depressed Mood: The predominant manifestations are symptoms such as low mood, tearfulness, or feelings of hopelessness      Functional Status:  Patient's symptoms are causing reduced functional status in the following areas: Activities of Daily Living - sleep difficulty, low energy  Social / Relational - some increased irritability      DSM5 Diagnoses: (Sustained by DSM5 Criteria Listed Above)  Diagnoses: 300.00 (F41.9) Unspecified Anxiety Disorder  Adjustment Disorders  309.0 (F43.21) With depressed mood   Rule out Generalized Anxiety Disorder  Psychosocial & Contextual Factors: worry about medical symptoms  WHODAS Score: 24  See Media section of [facility] medical record for completed  WHODAS    Preliminary Treatment Plan:    The client reports no currently identified Moravian, ethnic or cultural issues relevant to therapy.    Initial Treatment will focus on: Anxiety - excess worry, trouble relaxing, difficulty controlling the worry, increased irritability  Adjustment Difficulties related to: family concerns.    Chemical dependency recommendations: No indications of CD issues    As a preliminary treatment goal, patient will experience a reduction in anxiety, will develop more effective coping skills to manage anxiety symptoms, will develop healthy cognitive patterns and beliefs and will increase ability to function adaptively and will develop coping/problem-solving skills to facilitate more adaptive adjustment.    The focus of initial interventions will be to alleviate anxiety, alleviate depressed mood, increase ability to function adaptively, increase coping skills, teach CBT skills, teach mindfulness skills, teach relaxation strategies and teach sleep hygiene.    The patient is receiving treatment / structured support from the following professional(s) / service and treatment. Collaboration will be initiated with: primary care physician.    Referral to another professional/service is not indicated at this time..    A Release of Information is not needed at this time.    Report to child or adult protection services was NA.    ALBINA Weathers, Behavioral Health Clinician

## 2018-07-09 PROBLEM — F43.21 ADJUSTMENT DISORDER WITH DEPRESSED MOOD: Status: ACTIVE | Noted: 2018-07-09

## 2018-07-09 PROBLEM — F41.9 ANXIETY: Status: ACTIVE | Noted: 2018-06-20

## 2018-07-09 PROBLEM — F43.21 ADJUSTMENT DISORDER WITH DEPRESSED MOOD: Status: ACTIVE | Noted: 2018-06-20

## 2018-07-09 PROBLEM — F41.9 ANXIETY: Status: ACTIVE | Noted: 2018-07-09

## 2018-09-13 ENCOUNTER — TELEPHONE (OUTPATIENT)
Dept: FAMILY MEDICINE | Facility: CLINIC | Age: 33
End: 2018-09-13

## 2018-09-13 NOTE — TELEPHONE ENCOUNTER
AEBNA for pt to call us back.  I offered her next Thursday or Friday the 20 or 21st at 1:00 or 830am in doc only.  DECLAN

## 2018-09-13 NOTE — TELEPHONE ENCOUNTER
----- Message from Martin Daigle sent at 9/13/2018  1:02 AM CDT -----  Regarding: Appointment Request ()  Contact: [phone]  Appointment Request From: Martin Daigle    With Provider: Jerald Fenton MD [-Primary Care Physician-]    Preferred Date Range: From 9/13/2018 To 9/14/2018    Preferred Times: Any    Reason: To address the following health maintenance concerns.  Depression Action Plan Q1 Yr    Comments:  Anxiety is getting worse!

## 2018-10-08 ENCOUNTER — MYC MEDICAL ADVICE (OUTPATIENT)
Dept: FAMILY MEDICINE | Facility: CLINIC | Age: 33
End: 2018-10-08

## 2018-10-08 NOTE — TELEPHONE ENCOUNTER
Patient called clinic to schedule appt, first avail is not until 10.30, please advise on a time when patient could be seen sooner.  Thank you,  Jia Lucero  Patient Representative

## 2018-10-08 NOTE — TELEPHONE ENCOUNTER
Please call pt and offer her the 23rd of Oct at 1:00 for 30 minutes.  That is all I can offer.  KH

## 2018-10-23 ENCOUNTER — OFFICE VISIT (OUTPATIENT)
Dept: FAMILY MEDICINE | Facility: CLINIC | Age: 33
End: 2018-10-23
Payer: COMMERCIAL

## 2018-10-23 VITALS
HEIGHT: 62 IN | SYSTOLIC BLOOD PRESSURE: 124 MMHG | RESPIRATION RATE: 18 BRPM | TEMPERATURE: 98.2 F | DIASTOLIC BLOOD PRESSURE: 72 MMHG | HEART RATE: 102 BPM | BODY MASS INDEX: 32.57 KG/M2 | WEIGHT: 177 LBS | OXYGEN SATURATION: 100 %

## 2018-10-23 DIAGNOSIS — F41.9 ANXIETY: Primary | ICD-10-CM

## 2018-10-23 DIAGNOSIS — F43.21 ADJUSTMENT DISORDER WITH DEPRESSED MOOD: ICD-10-CM

## 2018-10-23 DIAGNOSIS — G43.109 MIGRAINE WITH AURA AND WITHOUT STATUS MIGRAINOSUS, NOT INTRACTABLE: ICD-10-CM

## 2018-10-23 PROCEDURE — 99214 OFFICE O/P EST MOD 30 MIN: CPT | Performed by: FAMILY MEDICINE

## 2018-10-23 ASSESSMENT — ANXIETY QUESTIONNAIRES
2. NOT BEING ABLE TO STOP OR CONTROL WORRYING: MORE THAN HALF THE DAYS
6. BECOMING EASILY ANNOYED OR IRRITABLE: MORE THAN HALF THE DAYS
IF YOU CHECKED OFF ANY PROBLEMS ON THIS QUESTIONNAIRE, HOW DIFFICULT HAVE THESE PROBLEMS MADE IT FOR YOU TO DO YOUR WORK, TAKE CARE OF THINGS AT HOME, OR GET ALONG WITH OTHER PEOPLE: NOT DIFFICULT AT ALL
GAD7 TOTAL SCORE: 14
1. FEELING NERVOUS, ANXIOUS, OR ON EDGE: MORE THAN HALF THE DAYS
7. FEELING AFRAID AS IF SOMETHING AWFUL MIGHT HAPPEN: MORE THAN HALF THE DAYS
5. BEING SO RESTLESS THAT IT IS HARD TO SIT STILL: MORE THAN HALF THE DAYS
3. WORRYING TOO MUCH ABOUT DIFFERENT THINGS: MORE THAN HALF THE DAYS

## 2018-10-23 ASSESSMENT — PAIN SCALES - GENERAL: PAINLEVEL: NO PAIN (0)

## 2018-10-23 ASSESSMENT — PATIENT HEALTH QUESTIONNAIRE - PHQ9: 5. POOR APPETITE OR OVEREATING: MORE THAN HALF THE DAYS

## 2018-10-23 NOTE — MR AVS SNAPSHOT
After Visit Summary   10/23/2018    Martin Daigle    MRN: [id number]           Patient Information     Date Of Birth          1985        Visit Information        Provider Department      10/23/2018 1:00 PM Jerald Fenton MD [facility]        Today's Diagnoses     Anxiety, unspecified    -  1       Follow-ups after your visit        Follow-up notes from your care team     Return in about 2 months (around 12/23/2018) for preventative visit (Physical).      Your next 10 appointments already scheduled     Dec 03, 2018  4:00 PM CST   [facility]YS[facility] with [facility] Fenton [facility]   [facility]    [address] [place] [id number] [phone]              Who to contact     If you have questions or need follow up information about today's clinic visit or your schedule please contact [facility] directly at [phone].  Normal or non-critical lab and imaging results will be communicated to you by MyChart, letter or phone within 4 business days after the clinic has received the results. If you do not hear from us within 7 days, please contact the clinic through [employer] or phone. If you have a critical or abnormal lab result, we will notify you by phone as soon as possible.  Submit refill requests through [employer] or call your pharmacy and they will forward the refill request to us. Please allow 3 business days for your refill to be completed.          Additional Information About Your Visit        MyChart Information     [employer] gives you secure access to your electronic health record. If you see a primary care provider, you can also send messages to your care team and make appointments. If you have questions, please call your primary care clinic.  If you do not have a primary care provider, please call [phone] and they will assist you.        Care EveryWhere ID     This is your Care EveryWhere ID. This could  "be used by other organizations to access your [facility] medical records  [id number]        Your Vitals Were     Pulse Temperature Respirations Height Pulse Oximetry Breastfeeding?    102 98.2  F (36.8  C) (Temporal) 18 5' 2\" (1.575 m) 100% No    BMI (Body Mass Index)                   32.37 kg/m2            Blood Pressure from Last 3 Encounters:   10/23/18 124/72   06/15/18 116/72   06/11/18 132/71    Weight from Last 3 Encounters:   10/23/18 177 lb (80.3 kg)   06/15/18 164 lb (74.4 kg)   06/11/18 165 lb (74.8 kg)              We Performed the Following     DEPRESSION ACTION PLAN (DAP)        Primary Care Provider Office Phone # Fax #    Jerald Fenton [phone] [address] [place] [address]        Equal Access to Services     [facility]: Hadii ruben armstrong hadasho Soomaali, waaxda luqadaha, qaybta kaalmada adeegyada, earnest natarajan haymaldonadon erik choudhury . So [facility] [phone].    ATENCIÓN: Si habla español, tiene a abarca disposición servicios gratuitos de asistencia lingüística. Llame al [phone].    We comply with applicable federal civil rights laws and [place] laws. We do not discriminate on the basis of race, color, national origin, age, disability, sex, sexual orientation, or gender identity.            Thank you!     Thank you for choosing [facility]  for your care. Our goal is always to provide you with excellent care. Hearing back from our patients is one way we can continue to improve our services. Please take a few minutes to complete the written survey that you may receive in the mail after your visit with us. Thank you!             Your Updated Medication List - Protect others around you: Learn how to safely use, store and throw away your medicines at www.disposemymeds.org.      Notice  As of 10/23/2018  2:20 PM    You have not been prescribed any medications.      "

## 2018-10-23 NOTE — Clinical Note
I see that you try calling the patient.  Thank you for following up on this.  She is very willing to try something to help reduce her stress level and I think some coaching from you would be of great value.  Thanks, Jerald

## 2018-10-23 NOTE — PROGRESS NOTES
SUBJECTIVE:   Martin Daigle is a 33 year old female who presents to clinic today for the following health issues:      Stress, migraines.         Problem list and histories reviewed & adjusted, as indicated.  Additional history: as documented        Reviewed and updated as needed this visit by clinical staff  Tobacco  Allergies  Meds  Problems  Soc Hx      Reviewed and updated as needed this visit by Provider  Allergies  Meds  Problems         Patient here today to discuss her recent changes and migraine symptoms.  In the past, patient noted that she would get onset of headache and then she would get flashes of light and wavy patterns with her vision in both eyes.  This would spontaneously resolve after about a day or so and she would feel exhausted and run down during the onset of these headaches.  Now, she notes that she is getting the flashes of light in the changes in vision preceding her headache.  She also notes that the headache is intense for up to an hour but she is still able to function during that time for the most part except for 10-15 minutes of having to remove herself from whatever situation she is currently participating in.  She states that overall her headaches are more manageable.  However, with the changes in her symptoms, she wanted to get this checked out make sure that she was okay.    Patient states that she continues to have issues with anxiety.  Last week, she had persistent intermittent heart palpitations that she attributes to her higher stress level.  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  She states that she has multiple laundry baskets of clothes that are folded and not put away and this bothers her .  Patient states that she  "personally has not at all troubled by the lack of artery been put away.  She states that she feels like she has lots of things to do and not enough time to do them.  She states that she oftentimes feels overwhelmed.    Patient is trying to get in with Debbie Alanis, our behavioral health clinician, but she has not been able to connect with her.  She knows that she would benefit from some increased counseling and stress management advice.    Patient reports no chest pains, shortness of breath, dyspnea on exertion.    ROS:  10 point ROS of systems including Constitutional, Eyes, HENT, Respiratory, Cardiovascular, Gastroenterology, Genitourinary, Integumentary, Muscularskeletal, Psychiatric were all negative except for pertinent positives noted in my HPI.     OBJECTIVE:   /72  Pulse 102  Temp 98.2  F (36.8  C) (Temporal)  Resp 18  Ht 5' 2\" (1.575 m)  Wt 177 lb (80.3 kg)  SpO2 100%  Breastfeeding? No  BMI 32.37 kg/m2  Body mass index is 32.37 kg/(m^2).  Physical Exam   Constitutional: She is oriented to person, place, and time. She appears well-developed and well-nourished.   Cardiovascular: Normal rate, regular rhythm, S1 normal, S2 normal and normal heart sounds.    No murmur heard.  Pulmonary/Chest: Effort normal and breath sounds normal. No respiratory distress. She has no wheezes. She has no rhonchi. She has no rales.   Neurological: She is alert and oriented to person, place, and time. She has normal strength and normal reflexes. No cranial nerve deficit or sensory deficit. She displays a negative Romberg sign.   Reflex Scores:       Tricep reflexes are 2+ on the right side and 2+ on the left side.       Bicep reflexes are 2+ on the right side and 2+ on the left side.       Patellar reflexes are 2+ on the right side and 2+ on the left side.  Normal finger-nose-finger, normal rapid alternating movements, normal heel to shin.  Patient's gait is normal.       ASSESSMENT/PLAN:       ICD-10-CM    1. " Anxiety, unspecified F41.9    2. Adjustment disorder with depressed mood F43.21    3. Migraine with aura and without status migrainosus, not intractable G43.109      PLAN:  1.  We reviewed patient's headache symptoms.  I do not have any concerns regarding worsening pathology other than migrainous headaches possibly triggered by stress/tension headaches.  She will continue to monitor symptoms and if she has any issues with muscle weakness, speech problems or any other changes in neurological status, she will contact me for further discussion.  2.  We talked a great deal about anxiety management and stress reduction.  I informed her that with her busy life raising 3 kids, being employed in a demanding full-time job and living in the same house as her parents are all sources of stress and that challenge for her is to find ways to help reduce that stress.  I informed patient that I would help facilitate an appointment with Debbie Alanis, our behavioral health clinician we also discussed ways in which the patient can work on self-cares to help reduce her stress level.  We discussed things such as improving her sleep and finding time to exercise even in small amounts.  She is able to do some of these things and incorporate them into her family time, she may find that her overall mood improves.    Follow up with Provider - Return in about 2 months (around 12/23/2018) for preventative visit (Physical).      I spent >25 minutes of face to face time with the patient, >50% of which was spent counseling and coordination of care regarding anxiety management.     Jerald Fenton MD   [facility]

## 2018-10-24 ENCOUNTER — TELEPHONE (OUTPATIENT)
Dept: BEHAVIORAL HEALTH | Facility: CLINIC | Age: 33
End: 2018-10-24

## 2018-10-24 PROBLEM — G43.109 MIGRAINE WITH AURA AND WITHOUT STATUS MIGRAINOSUS, NOT INTRACTABLE: Status: ACTIVE | Noted: 2018-10-24

## 2018-10-24 ASSESSMENT — ANXIETY QUESTIONNAIRES: GAD7 TOTAL SCORE: 14

## 2018-10-24 ASSESSMENT — PATIENT HEALTH QUESTIONNAIRE - PHQ9: SUM OF ALL RESPONSES TO PHQ QUESTIONS 1-9: 19

## 2018-10-24 NOTE — TELEPHONE ENCOUNTER
Phone Encounter   [facility] made attempt to reach patient, by patient and PCP request. LM informing patient of how to schedule with this writer. Call back number provided if she wished to speak with this writer as well.

## 2018-11-06 ENCOUNTER — OFFICE VISIT (OUTPATIENT)
Dept: BEHAVIORAL HEALTH | Facility: CLINIC | Age: 33
End: 2018-11-06
Payer: COMMERCIAL

## 2018-11-06 DIAGNOSIS — F41.9 ANXIETY: Primary | ICD-10-CM

## 2018-11-06 DIAGNOSIS — F43.21 ADJUSTMENT DISORDER WITH DEPRESSED MOOD: ICD-10-CM

## 2018-11-06 PROCEDURE — 90832 PSYTX W PT 30 MINUTES: CPT | Performed by: MARRIAGE & FAMILY THERAPIST

## 2018-11-06 NOTE — PROGRESS NOTES
[facility]  November 6, 2018      Behavioral Health Clinician Progress Note    Patient Name: Martin Daigle           Service Type:  Individual      Service Location:   Face to Face in Clinic     Session Start Time: 1:00  Session End Time: 1:30      Session Length: 16 - 37      Attendees: Patient    Visit Activities (Refresh list every visit): [facility] Only    Diagnostic Assessment Date: 6/20/18  Treatment Plan Review Date: due next visit  See Flowsheets for today's PHQ-9 and KANE-7 results  Previous PHQ-9:   PHQ-9 SCORE 5/18/2018 6/15/2018 10/23/2018   Total Score - - -   Total Score 18 12 19     Previous KANE-7:   KANE-7 SCORE 5/18/2018 6/15/2018 10/23/2018   Total Score 21 14 14       LICHA LEVEL:  LICHA Score (Last Two) 12/27/2011   LICHA Raw Score 41   Activation Score 63.2   LICHA Level 3       DATA  Extended Session (60+ minutes): No  Interactive Complexity: No  Crisis: No  [facility] Patient: No    Treatment Objective(s) Addressed in This Session:  Target Behavior(s): disease management/lifestyle changes anxiety and depressed mood    Depressed Mood: Increase interest, engagement, and pleasure in doing things  Decrease frequency and intensity of feeling down, depressed, hopeless  Identify negative self-talk and behaviors: challenge core beliefs, myths, and actions  Improve concentration, focus, and mindfulness in daily activities   Anxiety: will experience a reduction in anxiety, will develop more effective coping skills to manage anxiety symptoms, will develop healthy cognitive patterns and beliefs and will increase ability to function adaptively    Current Stressors / Issues:  Patient reports increased stress and anxiety. Patient reports that she has noticed that a week prior to her menstrual period she experiences heart palpitations and the last time this happened it lasted a week.  Patient reports that her primary stress is with parenting her three year old, and managing things around the home.  She identified that she doesn't really get down time.  She reports that she started taking a supplement called Vivo by [employer]. She reports that she has taken it for a couple weeks and finds that it helps some.  Patient reports that her sleep is inconsistent.    Discussed sleep and exercise. Encouraged use of sleep hygiene practices and how incorporating deliberate exercise, even including kids, would help. Reflected that if she doesn't fit this in, she need not be hard on her self.  Provided psycho-education on Progressive muscle relaxation and patient would like to do this at her next visit.    Progress on Treatment Objective(s) / Homework:  Minimal progress - PREPARATION (Decided to change - considering how); Intervened by negotiating a change plan and determining options / strategies for behavior change, identifying triggers, exploring social supports, and working towards setting a date to begin behavior change    Motivational Interviewing    MI Intervention: Expressed Empathy/Understanding, Supported Autonomy, Collaboration, Evocation, Permission to raise concern or advise, Open-ended questions, Reflections: simple and complex, Change talk (evoked) and Reframe     Change Talk Expressed by the Patient: Desire to change Ability to change Reasons to change Need to change Committment to change Activation Taking steps    Provider Response to Change Talk: E - Evoked more info from patient about behavior change, A - Affirmed patient's thoughts, decisions, or attempts at behavior change, R - Reflected patient's change talk and S - Summarized patient's change talk statements    Also provided psychoeducation about behavioral health condition, symptoms, and treatment options    Care Plan review completed: Yes    Medication Review:  No current psychiatric medications prescribed    Medication Compliance:  NA    Changes in Health Issues:   Yes: headaches, changes to how the headache presents, Associated Psychological  Distress    Chemical Use Review:   Substance Use: Chemical use reviewed, no active concerns identified      Tobacco Use: No current tobacco use.      Assessment: Current Emotional / Mental Status (status of significant symptoms):  Risk status (Self / Other harm or suicidal ideation)  Patient denies a history of suicidal ideation, suicide attempts, self-injurious behavior, homicidal ideation, homicidal behavior and and other safety concerns  Patient denies current fears or concerns for personal safety.  Patient denies current or recent suicidal ideation or behaviors.  Patient denies current or recent homicidal ideation or behaviors.  Patient denies current or recent self injurious behavior or ideation.  Patient denies other safety concerns.  A safety and risk management plan has not been developed at this time, however patient was encouraged to call [place] [phone] should there be a change in any of these risk factors.    Appearance:   Appropriate   Eye Contact:   Good   Psychomotor Behavior: Normal   Attitude:   Cooperative   Orientation:   All  Speech   Rate / Production: Normal    Volume:  Normal   Mood:    Normal  Affect:    Appropriate   Thought Content:  Clear   Thought Form:  Coherent  Logical   Insight:    Good     Diagnoses:  1. Anxiety, unspecified    2. Adjustment disorder with depressed mood        Collateral Reports Completed:  Not Applicable    Plan: (Homework, other):  Patient was given information about behavioral services and encouraged to schedule a follow up appointment with the clinic [facility] in 2 weeks.  She was also given information about mental health symptoms and treatment options , Cognitive Behavioral Therapy skills to practice when experiencing anxiety and deep Breathing Strategies to practice when experiencing anxiety.  CD Recommendations: No indications of CD issues. Patient will think about what self care looks like for her. She will work on use of deep breathing, daily. Patient would  like to do progressive muscle exercise in next visit. ALBINA Kirby, [facility]

## 2018-12-02 ASSESSMENT — ENCOUNTER SYMPTOMS
ARTHRALGIAS: 0
PARESTHESIAS: 0
DIARRHEA: 0
CHILLS: 0
SORE THROAT: 1
NAUSEA: 0
HEARTBURN: 0
DIZZINESS: 1
HEADACHES: 1
NERVOUS/ANXIOUS: 1
COUGH: 0
ABDOMINAL PAIN: 0
JOINT SWELLING: 0
SHORTNESS OF BREATH: 0
EYE PAIN: 0
HEMATURIA: 0
PALPITATIONS: 1
WEAKNESS: 1
CONSTIPATION: 0
HEMATOCHEZIA: 0
FREQUENCY: 0
FEVER: 0
BREAST MASS: 0
MYALGIAS: 1

## 2018-12-02 ASSESSMENT — PATIENT HEALTH QUESTIONNAIRE - PHQ9
SUM OF ALL RESPONSES TO PHQ QUESTIONS 1-9: 14
10. IF YOU CHECKED OFF ANY PROBLEMS, HOW DIFFICULT HAVE THESE PROBLEMS MADE IT FOR YOU TO DO YOUR WORK, TAKE CARE OF THINGS AT HOME, OR GET ALONG WITH OTHER PEOPLE: SOMEWHAT DIFFICULT
SUM OF ALL RESPONSES TO PHQ QUESTIONS 1-9: 14

## 2018-12-03 ENCOUNTER — OFFICE VISIT (OUTPATIENT)
Dept: FAMILY MEDICINE | Facility: CLINIC | Age: 33
End: 2018-12-03
Payer: COMMERCIAL

## 2018-12-03 VITALS
SYSTOLIC BLOOD PRESSURE: 124 MMHG | BODY MASS INDEX: 31.83 KG/M2 | RESPIRATION RATE: 18 BRPM | WEIGHT: 173 LBS | DIASTOLIC BLOOD PRESSURE: 72 MMHG | HEIGHT: 62 IN | HEART RATE: 76 BPM | TEMPERATURE: 98.2 F | OXYGEN SATURATION: 100 %

## 2018-12-03 DIAGNOSIS — Z12.4 SCREENING FOR CERVICAL CANCER: ICD-10-CM

## 2018-12-03 DIAGNOSIS — H72.91 PERFORATION OF RIGHT TYMPANIC MEMBRANE: ICD-10-CM

## 2018-12-03 DIAGNOSIS — Z00.00 ENCOUNTER FOR ROUTINE ADULT HEALTH EXAMINATION WITHOUT ABNORMAL FINDINGS: Primary | ICD-10-CM

## 2018-12-03 DIAGNOSIS — F41.9 ANXIETY: ICD-10-CM

## 2018-12-03 PROCEDURE — 99395 PREV VISIT EST AGE 18-39: CPT | Performed by: FAMILY MEDICINE

## 2018-12-03 PROCEDURE — 87624 HPV HI-RISK TYP POOLED RSLT: CPT | Performed by: FAMILY MEDICINE

## 2018-12-03 PROCEDURE — G0145 SCR C/V CYTO,THINLAYER,RESCR: HCPCS | Performed by: FAMILY MEDICINE

## 2018-12-03 ASSESSMENT — ENCOUNTER SYMPTOMS
PALPITATIONS: 1
NAUSEA: 0
CONSTIPATION: 0
ARTHRALGIAS: 0
EYE PAIN: 0
HEADACHES: 1
BREAST MASS: 0
COUGH: 0
HEARTBURN: 0
NERVOUS/ANXIOUS: 1
DIARRHEA: 0
SHORTNESS OF BREATH: 0
SORE THROAT: 1
FEVER: 0
DIZZINESS: 1
FREQUENCY: 0
CHILLS: 0
PARESTHESIAS: 0
ABDOMINAL PAIN: 0
WEAKNESS: 1
MYALGIAS: 1
HEMATOCHEZIA: 0
JOINT SWELLING: 0
HEMATURIA: 0

## 2018-12-03 ASSESSMENT — PATIENT HEALTH QUESTIONNAIRE - PHQ9: SUM OF ALL RESPONSES TO PHQ QUESTIONS 1-9: 14

## 2018-12-03 ASSESSMENT — PAIN SCALES - GENERAL: PAINLEVEL: NO PAIN (0)

## 2018-12-03 NOTE — MR AVS SNAPSHOT
"              After Visit Summary   12/3/2018    Martin Daigle    MRN: [id number]           Patient Information     Date Of Birth          1985        Visit Information        Provider Department      12/3/2018 4:00 PM Jerald Fenton MD [facility]        Today's Diagnoses     Encounter for routine adult health examination without abnormal findings    -  1    Screening for cervical cancer           Follow-ups after your visit        Who to contact     If you have questions or need follow up information about today's clinic visit or your schedule please contact [facility] directly at [phone].  Normal or non-critical lab and imaging results will be communicated to you by [employer], letter or phone within 4 business days after the clinic has received the results. If you do not hear from us within 7 days, please contact the clinic through [employer] or phone. If you have a critical or abnormal lab result, we will notify you by phone as soon as possible.  Submit refill requests through [employer] or call your pharmacy and they will forward the refill request to us. Please allow 3 business days for your refill to be completed.          Additional Information About Your Visit        MyChart Information     [employer] gives you secure access to your electronic health record. If you see a primary care provider, you can also send messages to your care team and make appointments. If you have questions, please call your primary care clinic.  If you do not have a primary care provider, please call [phone] and they will assist you.        Care EveryWhere ID     This is your Care EveryWhere ID. This could be used by other organizations to access your [place] medical records  [id number]        Your Vitals Were     Pulse Temperature Respirations Height Last Period Pulse Oximetry    76 98.2  F (36.8  C) (Temporal) 18 5' 2\" (1.575 m) 11/05/2018 100%    Breastfeeding? BMI (Body Mass " Index)                No 31.64 kg/m2           Blood Pressure from Last 3 Encounters:   12/03/18 124/72   10/23/18 124/72   06/15/18 116/72    Weight from Last 3 Encounters:   12/03/18 173 lb (78.5 kg)   10/23/18 177 lb (80.3 kg)   06/15/18 164 lb (74.4 kg)              We Performed the Following     HPV High Risk Types DNA Cervical     Pap imaged thin layer screen with HPV - recommended age 30 - 65 years (select HPV order below)        Primary Care Provider Office Phone # Fax #    Jerald Fenton [phone] [address] [place] [address]        Equal Access to Services     DINO MCCALLUM : Stef Putnam, qi quiñones, ivet rondon, earnest johns. So [facility] [phone].    ATENCIÓN: Si habla español, tiene a abarca disposición servicios gratuitos de asistencia lingüística. Llame al [phone].    We comply with applicable federal civil rights laws and [place] laws. We do not discriminate on the basis of race, color, national origin, age, disability, sex, sexual orientation, or gender identity.            Thank you!     Thank you for choosing [facility]  for your care. Our goal is always to provide you with excellent care. Hearing back from our patients is one way we can continue to improve our services. Please take a few minutes to complete the written survey that you may receive in the mail after your visit with us. Thank you!             Your Updated Medication List - Protect others around you: Learn how to safely use, store and throw away your medicines at www.disposemymeds.org.      Notice  As of 12/3/2018  5:38 PM    You have not been prescribed any medications.

## 2018-12-03 NOTE — PROGRESS NOTES
SUBJECTIVE:   CC: Martin Daigle is an 33 year old woman who presents for preventive health visit.     Physical   Annual:     Getting at least 3 servings of Calcium per day:  Yes    Bi-annual eye exam:  Yes    Dental care twice a year:  NO    Sleep apnea or symptoms of sleep apnea:  Daytime drowsiness    Diet:  Regular (no restrictions)    Frequency of exercise:  1 day/week    Duration of exercise:  15-30 minutes    Taking medications regularly:  Yes    Medication side effects:  None and Lightheadedness    Additional concerns today:  Yes    PHQ-2 Total Score: 3  Depression     Diet:  Regular (no restrictions)  Frequency of exercise:  1 day/week  Duration of exercise:  15-30 minutes  Taking medications regularly:  Yes  Medication side effects:  None and Lightheadedness  Additional concerns today:  Yes  Anxiety   Associated symptoms include headaches, myalgias, a sore throat and weakness. Pertinent negatives include no abdominal pain, arthralgias, chest pain, chills, congestion, coughing, fever, joint swelling, nausea or rash.     Today's PHQ-2 Score:   PHQ-2 ( 1999 [employer]) 12/2/2018   Q1: Little interest or pleasure in doing things 1   Q2: Feeling down, depressed or hopeless 2   PHQ-2 Score 3   Q1: Little interest or pleasure in doing things Several days   Q2: Feeling down, depressed or hopeless More than half the days   PHQ-2 Score 3       Abuse: Current or Past(Physical, Sexual or Emotional)- No  Do you feel safe in your environment? Yes    Social History   Substance Use Topics     Smoking status: Never Smoker     Smokeless tobacco: Never Used     Alcohol use No     Alcohol Use 12/2/2018   If you drink alcohol do you typically have greater than 3 drinks per day OR greater than 7 drinks per week? No   No flowsheet data found.    Reviewed orders with patient.  Reviewed health maintenance and updated orders accordingly - Yes  Labs reviewed in EPIC    Mammogram not appropriate for this patient based on  "age.    Pertinent mammograms are reviewed under the imaging tab.  History of abnormal Pap smear: NO - age 30-65 PAP every 5 years with negative HPV co-testing recommended  PAP / HPV 1/25/2016 4/5/2013 12/27/2011   PAP NIL NIL NIL     Reviewed and updated as needed this visit by clinical staff  Tobacco  Allergies  Meds  Problems  Med Hx  Surg Hx  Fam Hx  Soc Hx          Reviewed and updated as needed this visit by Provider  Allergies  Meds  Problems  Med Hx  Surg Hx  Fam Hx            Review of Systems   Constitutional: Negative for chills and fever.   HENT: Positive for ear pain and sore throat. Negative for congestion and hearing loss.    Eyes: Negative for pain and visual disturbance.   Respiratory: Negative for cough and shortness of breath.    Cardiovascular: Positive for palpitations. Negative for chest pain and peripheral edema.   Gastrointestinal: Negative for abdominal pain, constipation, diarrhea, heartburn, hematochezia and nausea.   Breasts:  Negative for tenderness, breast mass and discharge.   Genitourinary: Negative for frequency, genital sores, hematuria, pelvic pain, urgency, vaginal bleeding and vaginal discharge.   Musculoskeletal: Positive for myalgias. Negative for arthralgias and joint swelling.   Skin: Negative for rash.   Neurological: Positive for dizziness, weakness and headaches. Negative for paresthesias.   Psychiatric/Behavioral: Positive for mood changes. The patient is nervous/anxious.         OBJECTIVE:   /72  Pulse 76  Temp 98.2  F (36.8  C) (Temporal)  Resp 18  Ht 5' 2\" (1.575 m)  Wt 173 lb (78.5 kg)  LMP 11/05/2018  SpO2 100%  Breastfeeding? No  BMI 31.64 kg/m2  Physical Exam   Constitutional: She is oriented to person, place, and time. Vital signs are normal. She appears well-developed and well-nourished. No distress.   HENT:   Right Ear: Hearing, tympanic membrane, external ear and ear canal normal.   Left Ear: Hearing, tympanic membrane, external ear " and ear canal normal.   Nose: Nose normal.   Mouth/Throat: Uvula is midline, oropharynx is clear and moist and mucous membranes are normal. No oropharyngeal exudate or posterior oropharyngeal erythema.   Old TM perforation   Eyes: Conjunctivae, EOM and lids are normal. Pupils are equal, round, and reactive to light. Right eye exhibits no discharge. Left eye exhibits no discharge.   Neck: Normal range of motion. Neck supple. No tracheal deviation present. No thyromegaly present.   Cardiovascular: Normal rate, regular rhythm, S1 normal, S2 normal, normal heart sounds and normal pulses.  Exam reveals no S3, no S4 and no friction rub.    No murmur heard.  Pulmonary/Chest: Effort normal and breath sounds normal. No respiratory distress. She has no wheezes. She has no rales.   Abdominal: Soft. Normal appearance and bowel sounds are normal. She exhibits no mass. There is no hepatosplenomegaly. There is no tenderness.   Genitourinary: No labial fusion. There is no rash, tenderness, lesion or injury on the right labia. There is no rash, tenderness, lesion or injury on the left labia. Cervix exhibits no discharge and no friability. No bleeding in the vagina. No vaginal discharge found.   Genitourinary Comments: Pap obtained   Musculoskeletal: Normal range of motion. She exhibits no edema.   Lymphadenopathy:     She has no cervical adenopathy.        Right: No supraclavicular adenopathy present.        Left: No supraclavicular adenopathy present.   Neurological: She is alert and oriented to person, place, and time. She has normal strength and normal reflexes. No cranial nerve deficit or sensory deficit. She exhibits normal muscle tone.   Skin: Skin is warm, dry and intact. No rash noted.   Psychiatric: She has a normal mood and affect. Judgment and thought content normal. Cognition and memory are normal.         ASSESSMENT/PLAN:       ICD-10-CM    1. Encounter for routine adult health examination without abnormal findings  "Z00.00    2. Screening for cervical cancer Z12.4 Pap imaged thin layer screen with HPV - recommended age 30 - 65 years (select HPV order below)     HPV High Risk Types DNA Cervical   3. Anxiety, unspecified F41.9       Did offer propranolol for her palpitations.  She will think about it and let me know.    COUNSELING:  Reviewed preventive health counseling, as reflected in patient instructions    BP Readings from Last 1 Encounters:   12/03/18 124/72     Estimated body mass index is 31.64 kg/(m^2) as calculated from the following:    Height as of this encounter: 5' 2\" (1.575 m).    Weight as of this encounter: 173 lb (78.5 kg).    BP Screening:   Last 3 BP Readings:    BP Readings from Last 3 Encounters:   12/03/18 124/72   10/23/18 124/72   06/15/18 116/72       The following was recommended to the patient:  Re-screen BP within a year and recommended lifestyle modifications  Weight management plan: Discussed healthy diet and exercise guidelines     reports that she has never smoked. She has never used smokeless tobacco.      Counseling Resources:  ATP IV Guidelines  Pooled Cohorts Equation Calculator  Breast Cancer Risk Calculator  FRAX Risk Assessment  ICSI Preventive Guidelines  Dietary Guidelines for Americans, 2010  [employer]'s MyPlate  ASA Prophylaxis  Lung CA Screening    Jerald Fenton MD  [facility]  Answers for HPI/ROS submitted by the patient on 12/2/2018   If you checked off any problems, how difficult have these problems made it for you to do your work, take care of things at home, or get along with other people?: Somewhat difficult  PHQ9 TOTAL SCORE: 14    "

## 2018-12-06 LAB
COPATH REPORT: NORMAL
PAP: NORMAL

## 2018-12-07 LAB
FINAL DIAGNOSIS: NORMAL
HPV HR 12 DNA CVX QL NAA+PROBE: NEGATIVE
HPV16 DNA SPEC QL NAA+PROBE: NEGATIVE
HPV18 DNA SPEC QL NAA+PROBE: NEGATIVE
SPECIMEN DESCRIPTION: NORMAL
SPECIMEN SOURCE CVX/VAG CYTO: NORMAL

## 2018-12-20 ENCOUNTER — ALLIED HEALTH/NURSE VISIT (OUTPATIENT)
Dept: FAMILY MEDICINE | Facility: OTHER | Age: 33
End: 2018-12-20
Payer: COMMERCIAL

## 2018-12-20 DIAGNOSIS — Z23 NEED FOR PROPHYLACTIC VACCINATION AND INOCULATION AGAINST INFLUENZA: Primary | ICD-10-CM

## 2018-12-20 PROCEDURE — 90686 IIV4 VACC NO PRSV 0.5 ML IM: CPT

## 2018-12-20 PROCEDURE — 90471 IMMUNIZATION ADMIN: CPT

## 2018-12-20 PROCEDURE — 99207 ZZC NO CHARGE NURSE ONLY: CPT

## 2018-12-20 NOTE — PROGRESS NOTES
Injectable Influenza Immunization Documentation    1.  Is the person to be vaccinated sick today?   No    2. Does the person to be vaccinated have an allergy to a component   of the vaccine?   No  Egg Allergy Algorithm Link    3. Has the person to be vaccinated ever had a serious reaction   to influenza vaccine in the past?   No    4. Has the person to be vaccinated ever had Guillain-Barré syndrome?   No  Prior to injection, verified patient identity using patient's name and date of birth.  Due to injection administration, patient instructed to remain in clinic for 15 minutes  afterwards, and to report any adverse reaction to me immediately.    Flu Vaccine     Drug Amount Wasted:  None.  Vial/Syringe: Syringe    Form completed by Catherine Sánchez MA

## 2019-10-26 ENCOUNTER — HEALTH MAINTENANCE LETTER (OUTPATIENT)
Age: 34
End: 2019-10-26

## 2020-01-03 ENCOUNTER — IMMUNIZATION (OUTPATIENT)
Dept: FAMILY MEDICINE | Facility: OTHER | Age: 35
End: 2020-01-03
Payer: COMMERCIAL

## 2020-01-03 VITALS — BODY MASS INDEX: 30.65 KG/M2 | HEIGHT: 63 IN

## 2020-01-03 DIAGNOSIS — Z23 NEED FOR PROPHYLACTIC VACCINATION AND INOCULATION AGAINST INFLUENZA: Primary | ICD-10-CM

## 2020-01-03 PROCEDURE — 99207 ZZC NO CHARGE NURSE ONLY: CPT

## 2020-01-03 PROCEDURE — 90686 IIV4 VACC NO PRSV 0.5 ML IM: CPT

## 2020-01-03 PROCEDURE — 90471 IMMUNIZATION ADMIN: CPT

## 2020-01-03 ASSESSMENT — PATIENT HEALTH QUESTIONNAIRE - PHQ9: SUM OF ALL RESPONSES TO PHQ QUESTIONS 1-9: 7

## 2020-02-25 ENCOUNTER — MYC MEDICAL ADVICE (OUTPATIENT)
Dept: FAMILY MEDICINE | Facility: CLINIC | Age: 35
End: 2020-02-25

## 2020-03-15 ENCOUNTER — HEALTH MAINTENANCE LETTER (OUTPATIENT)
Age: 35
End: 2020-03-15

## 2020-03-26 NOTE — PROGRESS NOTES
"Martin Daigle is a 35 year old female who is being evaluated via a billable telephone visit.      The patient has been notified of following:     \"This telephone visit will be conducted via a call between you and your physician/provider. We have found that certain health care needs can be provided without the need for a physical exam.  This service lets us provide the care you need with a short phone conversation.  If a prescription is necessary we can send it directly to your pharmacy.  If lab work is needed we can place an order for that and you can then stop by our lab to have the test done at a later time.    If during the course of the call the physician/provider feels a telephone visit is not appropriate, you will not be charged for this service.\"     Martin Daigle complains of   Chief Complaint   Patient presents with     Telephone     migraines       Migraines- Maybe related to periods. Has seen eye doctor, got cleared. Stress related?      I have reviewed and updated the patient's Past Medical History, Social History, Family History and Medication List.    ALLERGIES  Penicillins      Telephone visit performed in lieu of an in-person visit due to current concerns regarding the current COVID-19 situation including social distancing and keeping at risk people safe.  Patient agreed to proceed with this visit due to these circumstances.    Patient has headache on the right temple and radiates up.  Does get some blurry and scattered vision sometimes before and after the headache.  Lasts for about an hour and she feels funny while she has it.  Symptoms usually get better faster with Excedrin.     Assessment/Plan:  ASSESSMENT/ORDERS:    ICD-10-CM    1. Migraine with aura and without status migrainosus, not intractable  G43.109 naratriptan (AMERGE) 2.5 MG tablet     PLAN:  1.  Trial of triptan.  See below for patient instructions for recommendations and discussion on today's health issues.    Patient " Instructions   Take 800 mg of ibuprofen or 500 mg of naproxen when you take migraine medication.      Take the migraine medication at the onset of symptoms.        Your online health resources for COVID-19   [email or web address].     If you have symptoms of COVID-19 (fever and/or new shortness of breath or new cough) and they do not involve severe shortness of breath, chest pain, or other emergency symptoms, contact:  ONLINE PREFERRED:  [email or web address]     If you DO NOT have web access, call:  [phone] for triage recommendations.           Return in about 4 weeks (around 4/24/2020) for recheck if symptoms worsen or fail to improve.     Phone call duration:  13 minutes    Jerald Fenton MD

## 2020-03-27 ENCOUNTER — VIRTUAL VISIT (OUTPATIENT)
Dept: FAMILY MEDICINE | Facility: CLINIC | Age: 35
End: 2020-03-27
Payer: COMMERCIAL

## 2020-03-27 DIAGNOSIS — G43.109 MIGRAINE WITH AURA AND WITHOUT STATUS MIGRAINOSUS, NOT INTRACTABLE: Primary | ICD-10-CM

## 2020-03-27 PROCEDURE — 99213 OFFICE O/P EST LOW 20 MIN: CPT | Mod: TEL | Performed by: FAMILY MEDICINE

## 2020-03-27 RX ORDER — NARATRIPTAN 2.5 MG/1
2.5 TABLET ORAL
Qty: 9 TABLET | Refills: 4 | Status: SHIPPED | OUTPATIENT
Start: 2020-03-27 | End: 2022-05-09

## 2020-03-27 NOTE — PATIENT INSTRUCTIONS
Take 800 mg of ibuprofen or 500 mg of naproxen when you take migraine medication.      Take the migraine medication at the onset of symptoms.        Your online health resources for COVID-19   [email or web address].     If you have symptoms of COVID-19 (fever and/or new shortness of breath or new cough) and they do not involve severe shortness of breath, chest pain, or other emergency symptoms, contact:  ONLINE PREFERRED:  [email or web address]     If you DO NOT have web access, call:  [phone] for triage recommendations.

## 2020-04-28 ENCOUNTER — MYC MEDICAL ADVICE (OUTPATIENT)
Dept: FAMILY MEDICINE | Facility: CLINIC | Age: 35
End: 2020-04-28

## 2020-04-28 NOTE — TELEPHONE ENCOUNTER
Patient is scheduled with JULIUS Chisholm tomorrow, 4/29/2020, at 1:00 for ear issues.  She will MyChart back if she cannot make this appointment.  Closing this encounter.  RENATA CastroN, RN

## 2020-04-28 NOTE — TELEPHONE ENCOUNTER
Asked further questions via [employer].  Will wait for response.  If no other symptoms that are listed, she should be scheduled face to face to have someone assess her ears.  RENATA CastroN, RN

## 2020-04-29 ENCOUNTER — OFFICE VISIT (OUTPATIENT)
Dept: FAMILY MEDICINE | Facility: CLINIC | Age: 35
End: 2020-04-29
Payer: COMMERCIAL

## 2020-04-29 VITALS
RESPIRATION RATE: 20 BRPM | TEMPERATURE: 98.7 F | SYSTOLIC BLOOD PRESSURE: 118 MMHG | HEIGHT: 63 IN | OXYGEN SATURATION: 100 % | DIASTOLIC BLOOD PRESSURE: 72 MMHG | HEART RATE: 72 BPM | BODY MASS INDEX: 30.33 KG/M2 | WEIGHT: 171.2 LBS

## 2020-04-29 DIAGNOSIS — H92.11 DISCHARGE OF RIGHT EAR PRESENT: Primary | ICD-10-CM

## 2020-04-29 PROCEDURE — 99213 OFFICE O/P EST LOW 20 MIN: CPT | Performed by: PHYSICIAN ASSISTANT

## 2020-04-29 RX ORDER — CEFDINIR 300 MG/1
300 CAPSULE ORAL 2 TIMES DAILY
Qty: 14 CAPSULE | Refills: 0 | Status: SHIPPED | OUTPATIENT
Start: 2020-04-29 | End: 2020-05-06

## 2020-04-29 ASSESSMENT — MIFFLIN-ST. JEOR: SCORE: 1442.75

## 2020-04-29 ASSESSMENT — PAIN SCALES - GENERAL: PAINLEVEL: MILD PAIN (2)

## 2020-04-29 NOTE — NURSING NOTE
Health Maintenance Due   Topic Date Due     PREVENTIVE CARE VISIT  12/03/2019     Keysha DE LEON LPN

## 2020-04-29 NOTE — PROGRESS NOTES
"Subjective     Martin Daigle is a 35 year old female who presents to clinic today for the following health issues:    HPI   Concern - right ear plugged and draining  Onset:  1 week    Description:   Plugged and draining    Intensity: mild, severe    Progression of Symptoms:  same    Accompanying Signs & Symptoms:  dizzy    Previous history of similar problem:   YES    Precipitating factors:   Worsened by: nothing    Alleviating factors:  Improved by: nothing    Therapies Tried and outcome: essential oils, ibuprofen; slight relief    Patient is a 35 year old female who presents today with concern about right ear infection. She says for the past 2 days she has had a yellow discharge off/on from the right ear canal with sensation of the ear feeling plugged. Patient has a history of ear issues from childhood including multiple infections as well as tubes. It was noted by PCP that her right TM is perforated. Denies fever, recent illness, use of foreign objects in the ear, pain or swelling. Patient does feel that her hearing in the right ear is reduced and that she has been experiencing more frequent headaches.     Reviewed and updated as needed this visit by Provider         Review of Systems   ROS COMP: Constitutional, HEENT, cardiovascular, pulmonary, gi and gu systems are negative, except as otherwise noted.      Objective    /72 (BP Location: Right arm, Patient Position: Chair, Cuff Size: Adult Large)   Pulse 72   Temp 98.7  F (37.1  C) (Oral)   Resp 20   Ht 1.588 m (5' 2.5\")   Wt 77.7 kg (171 lb 3.2 oz)   SpO2 100%   BMI 30.81 kg/m    Body mass index is 30.81 kg/m .  Physical Exam   GENERAL: healthy, alert and no distress  HENT:L ear canal and TM normal, R canal occluded by cerumen and white discharge, TM not visualized, nose and mouth without ulcers or lesions  RESP: lungs clear to auscultation - no rales, rhonchi or wheezes  CV: regular rate and rhythm, normal S1 S2, no S3 or S4, no murmur, click " or rub, no peripheral edema and peripheral pulses strong  MS: no gross musculoskeletal defects noted, no edema  NEURO: Normal strength and tone, mentation intact and speech normal  PSYCH: mentation appears normal, affect normal/bright    Diagnostic Test Results:  Labs reviewed in Epic        Assessment & Plan     1. Discharge of right ear present  Patient will work to keep ear dry while completing antibiotic for infection. Given the history of perforation I did not want to risk irrigation as it could spread infection deeper. Information sent home with patient regarding home therapies.   - cefdinir (OMNICEF) 300 MG capsule; Take 1 capsule (300 mg) by mouth 2 times daily for 7 days  Dispense: 14 capsule; Refill: 0      Return in about 4 months (around 8/29/2020).    Kash Chisholm PA-C  [facility]

## 2020-04-29 NOTE — PATIENT INSTRUCTIONS
Patient Education     Ruptured Infected Eardrum (Adult)    The middle ear is the space behind the eardrum. You have an infection of the middle ear. This can lead to pressure that causes the eardrum to break (rupture). This may cause sudden pain. Pus or blood will drain out of the ear canal. Your hearing will also likely be affected.  The infection may be treated with antibiotics. The eardrum usually heals completely on its own. If it does not, further treatment is needed. For this reason, it s important to have a follow-up exam with an ear specialist.  Home care    Keep taking prescribed antibiotics until all of the medicine is gone. Do this even if you feel better after the first few days.    Take any other medicines as prescribed.    Keep a clean cotton ball in the ear canal to absorb drainage. Change the cotton often, when it becomes soiled with fluid drainage. Don t let water get into the ear. Don t put any medicine drops into the ear unless your healthcare provider tells you to do so.  Follow-up care  Follow up with your healthcare provider in 2 weeks, or as advised. This is to make sure the infection is getting better and the eardrum is healing. Also follow up with specialists as advised for a hearing test or exam.  When to seek medical advice  Call your healthcare provider if you have any of the following:    Fever of 100.4 F (38 C) or higher    Pain that gets worse or doesn t get better    Unusual drowsiness or confusion    Headache, neck pain or a stiff neck    Convulsions (seizure)    Worsening dizziness    Worsening hearing loss or ringing in the ear  Date Last Reviewed: 10/1/2017    [id number] [employer]. [address] [place] [address], [place] [address]. All rights reserved. This information is not intended as a substitute for professional medical care. Always follow your healthcare professional's instructions.

## 2020-05-04 ENCOUNTER — MYC MEDICAL ADVICE (OUTPATIENT)
Dept: FAMILY MEDICINE | Facility: CLINIC | Age: 35
End: 2020-05-04

## 2021-01-10 ENCOUNTER — HEALTH MAINTENANCE LETTER (OUTPATIENT)
Age: 36
End: 2021-01-10

## 2021-03-22 ENCOUNTER — HOSPITAL ENCOUNTER (EMERGENCY)
Facility: CLINIC | Age: 36
Discharge: HOME | End: 2021-03-22
Attending: EMERGENCY MEDICINE | Admitting: EMERGENCY MEDICINE
Payer: COMMERCIAL

## 2021-03-22 VITALS
SYSTOLIC BLOOD PRESSURE: 132 MMHG | RESPIRATION RATE: 16 BRPM | DIASTOLIC BLOOD PRESSURE: 81 MMHG | OXYGEN SATURATION: 100 % | HEART RATE: 66 BPM | TEMPERATURE: 98.4 F

## 2021-03-22 DIAGNOSIS — H65.91 OME (OTITIS MEDIA WITH EFFUSION), RIGHT: ICD-10-CM

## 2021-03-22 PROCEDURE — 99282 EMERGENCY DEPT VISIT SF MDM: CPT | Performed by: EMERGENCY MEDICINE

## 2021-03-22 PROCEDURE — 99283 EMERGENCY DEPT VISIT LOW MDM: CPT | Performed by: EMERGENCY MEDICINE

## 2021-03-22 RX ORDER — CEFDINIR 300 MG/1
300 CAPSULE ORAL 2 TIMES DAILY
Qty: 20 CAPSULE | Refills: 0 | Status: SHIPPED | OUTPATIENT
Start: 2021-03-22 | End: 2021-04-01

## 2021-03-22 NOTE — ED PROVIDER NOTES
History     Chief Complaint   Patient presents with     Otalgia     HPI  Martin Daigle is a 36 year old female who   Presents to the emergency department secondary to right ear discomfort, muffled hearing, ear discharge.  She has no tenderness on the outside of the ear.  There is no severe pain.  She has a complicated history of ear tubes and perforation of her eardrums and does not think the right side ever healed.  Normally she does not have discharge from that ear.  She has had an allergy to penicillins but not to cephalosporins in the past.  No fever, cough, congestion or other new symptoms.    Allergies:  Allergies   Allergen Reactions     Penicillins        Problem List:    Patient Active Problem List    Diagnosis Date Noted     Migraine with aura and without status migrainosus, not intractable 10/24/2018     Priority: Medium     Anxiety, unspecified 2018     Priority: Medium     Adjustment disorder with depressed mood 2018     Priority: Medium     Perforation of right tympanic membrane 2018     Priority: Medium     CARDIOVASCULAR SCREENING; LDL GOAL LESS THAN 160 2011     Priority: Medium        Past Medical History:    Past Medical History:   Diagnosis Date     ABNORM ELECTROCARDIOGRAM 2001     ANXIETY STATE NOS 2001     Hx maternal GBS (group B streptococcus) affected , pregnant 2/3/2016     Syncope and collapse 2001     Tinea versicolor 2010       Past Surgical History:    Past Surgical History:   Procedure Laterality Date     HC CREATE EARDRUM OPENING,GEN ANESTH      P.E. Tubes x 2     ZZC NONSPECIFIC PROCEDURE      release trigger finger right hand       Family History:    Family History   Problem Relation Age of Onset     Hypertension Father      Diabetes Father         Diet controlled? Not insulin dependent      Osteoporosis Maternal Grandmother      Heart Disease Maternal Grandfather         cardiac sugery     Diabetes Paternal Grandfather          Insulin Dependent     Cerebrovascular Disease Paternal Grandfather      Allergies Sister        Social History:  Marital Status:   [2]  Social History     Tobacco Use     Smoking status: Never Smoker     Smokeless tobacco: Never Used   Substance Use Topics     Alcohol use: No     Alcohol/week: 0.0 standard drinks     Drug use: No        Medications:    cefdinir (OMNICEF) 300 MG capsule  naratriptan (AMERGE) 2.5 MG tablet          Review of Systems   All other systems reviewed and are negative.      Physical Exam   BP: 132/81  Pulse: 66  Temp: 98.4  F (36.9  C)  Resp: 16  SpO2: 100 %      Physical Exam  Constitutional:       General: She is not in acute distress.     Appearance: She is well-developed. She is not diaphoretic.   HENT:      Head: Normocephalic and atraumatic.      Comments: Right canal has yellow discharge that inhibits me from seeing the tympanic membrane.  There is no canal erythema or tenderness to the tragus.  Left canal is clear and tympanic membrane is scarred without erythema or bulging.     Right Ear: External ear normal.   Eyes:      General: No scleral icterus.  Neck:      Musculoskeletal: Normal range of motion and neck supple.   Skin:     General: Skin is warm and dry.      Coloration: Skin is not pale.      Findings: No erythema or rash.   Neurological:      Mental Status: She is alert and oriented to person, place, and time.         ED Course        Procedures                 No results found for this or any previous visit (from the past 24 hour(s)).    Medications - No data to display    Assessments & Plan (with Medical Decision Making)  Suspect otitis media with effusion through perforation in tympanic membrane although I cannot see the tympanic membrane.  There is no tragus tenderness or canal erythema to suggest otitis externa.  The patient will be treated with Omnicef x10 days and recommended follow-up with ENT if no improvement.  She agrees with that plan.  Return to ER  precautions were discussed.     I have reviewed the nursing notes.    I have reviewed the findings, diagnosis, plan and need for follow up with the patient.      New Prescriptions    CEFDINIR (OMNICEF) 300 MG CAPSULE    Take 1 capsule (300 mg) by mouth 2 times daily for 10 days       Final diagnoses:   OME (otitis media with effusion), right       3/22/2021   [facility] EMERGENCY DEPT     Gurpreet Guardado MD  03/22/21 [id number]

## 2021-03-22 NOTE — DISCHARGE INSTRUCTIONS
As discussed I think your infection is in the middle ear on the right and probably draining through a hole in your eardrum.  I could not see your eardrum but there was no evidence for inflammation of the canal.  I prescribed Omnicef and sent that to our pharmacy.  If you do not have any improvement follow-up with ENT as discussed.  I hope you get better quickly.

## 2021-03-23 ENCOUNTER — MYC MEDICAL ADVICE (OUTPATIENT)
Dept: FAMILY MEDICINE | Facility: CLINIC | Age: 36
End: 2021-03-23

## 2021-03-23 NOTE — TELEPHONE ENCOUNTER
Patient started taking Cefdinir for ear infection.  2 doses into treatment she developed a migraine.  She has not had one for 9 months.  She is wondering if this could be from the medication.    She is asked additional information via [employer].  Routing to PCP for further advice.    Laura Hood RN

## 2021-03-23 NOTE — TELEPHONE ENCOUNTER
She now may be feeling the effects of her migraine medication and/or the fact that she is also sick with an ear infection.  Will have staff notify patient.  I recommend she continue to monitor her symptoms.  Let us know if they worsen.    Jerald Fenton MD    Yes

## 2021-05-08 ENCOUNTER — HEALTH MAINTENANCE LETTER (OUTPATIENT)
Age: 36
End: 2021-05-08

## 2021-07-13 ENCOUNTER — MYC MEDICAL ADVICE (OUTPATIENT)
Dept: FAMILY MEDICINE | Facility: CLINIC | Age: 36
End: 2021-07-13

## 2021-07-13 NOTE — TELEPHONE ENCOUNTER
I have contacted the patient and scheduled her for an appt for 7/16/2021 at 8:30 am. Okay per Dr. Fenton. Nurys Alba, [facility]

## 2021-07-16 ENCOUNTER — OFFICE VISIT (OUTPATIENT)
Dept: FAMILY MEDICINE | Facility: CLINIC | Age: 36
End: 2021-07-16
Payer: COMMERCIAL

## 2021-07-16 VITALS
DIASTOLIC BLOOD PRESSURE: 72 MMHG | HEIGHT: 63 IN | BODY MASS INDEX: 31.68 KG/M2 | HEART RATE: 80 BPM | WEIGHT: 178.8 LBS | TEMPERATURE: 97.7 F | SYSTOLIC BLOOD PRESSURE: 118 MMHG | OXYGEN SATURATION: 98 % | RESPIRATION RATE: 16 BRPM

## 2021-07-16 DIAGNOSIS — F43.21 ADJUSTMENT DISORDER WITH DEPRESSED MOOD: ICD-10-CM

## 2021-07-16 DIAGNOSIS — G43.109 MIGRAINE WITH AURA AND WITHOUT STATUS MIGRAINOSUS, NOT INTRACTABLE: Primary | ICD-10-CM

## 2021-07-16 PROCEDURE — 99214 OFFICE O/P EST MOD 30 MIN: CPT | Performed by: FAMILY MEDICINE

## 2021-07-16 ASSESSMENT — PAIN SCALES - GENERAL: PAINLEVEL: NO PAIN (0)

## 2021-07-16 ASSESSMENT — MIFFLIN-ST. JEOR: SCORE: 1484.12

## 2021-07-16 NOTE — PATIENT INSTRUCTIONS
"Tc Hannon MD:  Stress Remedy - Relaxation on the Run:  [email or web address]     Insomnia information  [employer] patient information:  [email or web address] and type in \"insomnia\" on the search area.    [email or web address]    MyChart message about amitriptyline if you would like to try it.     See your dentist and find out about teeth grinding (bruxism)   "

## 2021-09-29 NOTE — PROGRESS NOTES
"    Assessment & Plan     ASSESSMENT/ORDERS:    ICD-10-CM    1. Migraine with aura and without status migrainosus, not intractable  G43.109    2. Adjustment disorder with depressed mood  F43.21      PLAN:  1.  Discussed that patient's worsening migraines may be stress induced or tension headaches either in addition to or triggering migraines.  See below for patient instructions for recommendations and discussion on how to help reduce stress as well as sleep better at night.  2.  Amitriptyline may be a good option for migraine prophylaxis if patient decides to try it.    Patient Instructions   Tc Hannon MD:  Stress Remedy - Relaxation on the Run:  [email or web address]     Insomnia information  American Academy [employer]icians patient information:  [email or web address] and type in \"insomnia\" on the search area.    [email or web address]    MyChart message about amitriptyline if you would like to try it.     See your dentist and find out about teeth grinding (bruxism)             33 minutes spent on the date of the encounter doing chart review and patient visit        BMI:   Estimated body mass index is 31.42 kg/m  as calculated from the following:    Height as of this encounter: 1.607 m (5' 3.25\").    Weight as of this encounter: 81.1 kg (178 lb 12.8 oz).           Return in about 4 weeks (around 8/13/2021) for recheck if symptoms worsen or fail to improve.    Jerald Fenton MD  [facility] Salazar is a 36 year old who presents for the following health issues     HPI     Migraine     Since your last clinic visit, how have your headaches changed?  Worsened    How often are you getting headaches or migraines? 4 over the last 12 days     Are you able to do normal daily activities when you have a migraine? Yes-most of the time    Are you taking rescue/relief medications? (Select all that apply) Amerge    How helpful is your rescue/relief " "medication?  The relief is inconsistent    Are you taking any medications to prevent migraines? (Select all that apply)  No    In the past 4 weeks, how often have you gone to urgent care or the emergency room because of your headaches?  0      How many servings of fruits and vegetables do you eat daily?  2-3    On average, how many sweetened beverages do you drink each day (Examples: soda, juice, sweet tea, etc.  Do NOT count diet or artificially sweetened beverages)?   1    How many days per week do you exercise enough to make your heart beat faster? 3 or less    How many minutes a day do you exercise enough to make your heart beat faster? 60 or more    How many days per week do you miss taking your medication? 0      This has been the first time in which Jamarcus has inconsistently worked to treat migraines.  She is getting the aura and sometimes medication will work and sometimes it isn't.  No other changes in the quality or type of headache she is having, just frequency and effect of medication.    Patient has noted that she had a recent death in her family and this has caused her to have increased stress as well as life in general.  Is having some right sided neck strain.  She states she may be grinding her teeth at night.  Not falling asleep or staying asleep consistently as well.      Review of Systems         Objective    /72   Pulse 80   Temp 97.7  F (36.5  C) (Temporal)   Resp 16   Ht 1.607 m (5' 3.25\")   Wt 81.1 kg (178 lb 12.8 oz)   LMP  (LMP Unknown)   SpO2 98%   BMI 31.42 kg/m    Body mass index is 31.42 kg/m .  Physical Exam  Constitutional:       Appearance: She is well-developed.   Eyes:      General: Lids are normal.      Extraocular Movements: Extraocular movements intact.      Conjunctiva/sclera: Conjunctivae normal.      Pupils: Pupils are equal, round, and reactive to light. Pupils are equal.   Cardiovascular:      Rate and Rhythm: Normal rate and regular rhythm.      Heart sounds: " Normal heart sounds, S1 normal and S2 normal. No murmur heard.     Pulmonary:      Effort: Pulmonary effort is normal. No respiratory distress.      Breath sounds: Normal breath sounds. No wheezing, rhonchi or rales.   Neurological:      Mental Status: She is alert.      Cranial Nerves: Cranial nerves are intact.      Sensory: Sensation is intact.      Motor: Motor function is intact.      Coordination: Coordination is intact.      Gait: Gait is intact.      Deep Tendon Reflexes:      Reflex Scores:       Patellar reflexes are 2+ on the right side and 2+ on the left side.                        no

## 2021-09-30 ENCOUNTER — MYC MEDICAL ADVICE (OUTPATIENT)
Dept: FAMILY MEDICINE | Facility: CLINIC | Age: 36
End: 2021-09-30

## 2021-10-01 ENCOUNTER — VIRTUAL VISIT (OUTPATIENT)
Dept: FAMILY MEDICINE | Facility: CLINIC | Age: 36
End: 2021-10-01
Payer: COMMERCIAL

## 2021-10-01 DIAGNOSIS — H66.91 RIGHT ACUTE OTITIS MEDIA: Primary | ICD-10-CM

## 2021-10-01 PROCEDURE — 99213 OFFICE O/P EST LOW 20 MIN: CPT | Mod: TEL | Performed by: PHYSICIAN ASSISTANT

## 2021-10-01 RX ORDER — OFLOXACIN 3 MG/ML
5 SOLUTION AURICULAR (OTIC) DAILY
Qty: 10 ML | Refills: 0 | Status: SHIPPED | OUTPATIENT
Start: 2021-10-01 | End: 2021-10-11

## 2021-10-01 NOTE — PROGRESS NOTES
Martin is a 36 year old who is being evaluated via a billable telephone visit.      Assessment & Plan     Right acute otitis media  - ofloxacin (FLOXIN) 0.3 % otic solution; Place 5 drops into the right ear daily for 10 days    Very likely no infection at this time. Ofloxacin drops were put on hold at the pharmacy. Patient will fill and use these if needed.    20 minutes spent on the date of the encounter doing chart review, patient visit and documentation       Return in about 1 week (around 10/8/2021) for Recheck with primary care provider if not improving.    Rody Salazar PA-C  [facility]   Martin is a 36 year old who presents for the following health issues   Acute Illness  Acute illness concerns:  Ear екатерина  Onset/Duration: COVID started Monday, ear pain Tuesday pm  Symptoms:  Fever: no  Chills/Sweats: YES- Wednesday but not since  Headache (location?): no, but previous days  Sinus Pressure: YES yesterday, not today  Ear Pain: yes- no discharge, intermittent since Tuesday, yesterday hurt all day  Rhinorrhea: no  Congestion: no  Sore Throat: YES- not today  Cough: YES-mild, from throat, no SOB, improving over time  Wheeze: no  Decreased Appetite: no  Nausea: last pm and this morning  Diarrhea: no  Therapies tried and outcome: danie Salazar presents via telephone for evaluation of ear pain. Symptoms first began 4 days ago with chills sweats, sinus pressure, sore throat, mild cough. She tested positive for Covid. She then developed ear pain the following day and symptoms have been intermittent. She did have more persistent ear pain yesterday however today her ear pain has pretty much resolved. She applied heat to her ear which is helped to improve her symptoms. She does have a history of several ear infections and currently has a hole in her eardrum. She notes that this hole is larger than the size of the tube. She has not noticed any drainage coming from her  ear.    Review of Systems   ROS negative except as noted above      Objective           Vitals:  No vitals were obtained today due to virtual visit.    Physical Exam   healthy, alert and no distress  PSYCH: Alert and oriented times 3; coherent speech, normal   rate and volume, able to articulate logical thoughts, able   to abstract reason, no tangential thoughts, no hallucinations   or delusions  Her affect is normal and pleasant  RESP: No cough, no audible wheezing, able to talk in full sentences  Remainder of exam unable to be completed due to telephone visits    No results found for any visits on 10/01/21.        Phone call duration: 10 minutes

## 2021-10-01 NOTE — PATIENT INSTRUCTIONS
Use antibiotic drops if pain worsens or discharge is noted from ear.  Tylenol and/or ibuprofen for pain relief and fever reduction.  Warm compresses next to ear for pain relief.  Drink plenty of fluids and place a humidifier in bedroom.  Ear infections are not contagious.  Follow up with primary care provider in 10-14 days if any concern of persistent infection.

## 2021-10-23 ENCOUNTER — HEALTH MAINTENANCE LETTER (OUTPATIENT)
Age: 36
End: 2021-10-23

## 2022-05-09 ENCOUNTER — MYC MEDICAL ADVICE (OUTPATIENT)
Dept: FAMILY MEDICINE | Facility: CLINIC | Age: 37
End: 2022-05-09
Payer: COMMERCIAL

## 2022-05-09 DIAGNOSIS — G43.109 MIGRAINE WITH AURA AND WITHOUT STATUS MIGRAINOSUS, NOT INTRACTABLE: ICD-10-CM

## 2022-05-09 RX ORDER — NARATRIPTAN 2.5 MG/1
2.5 TABLET ORAL
Qty: 9 TABLET | Refills: 4 | Status: CANCELLED | OUTPATIENT
Start: 2022-05-09

## 2022-05-09 RX ORDER — NARATRIPTAN 2.5 MG/1
TABLET ORAL
Qty: 9 TABLET | Refills: 0 | Status: SHIPPED | OUTPATIENT
Start: 2022-05-09 | End: 2022-10-16

## 2022-05-09 NOTE — TELEPHONE ENCOUNTER
There is already a request in for this.  RN sent it high priority for review.  Patient is informed via [employer].  Closing this encounter.  RENATA CastroN, RN

## 2022-06-04 ENCOUNTER — HEALTH MAINTENANCE LETTER (OUTPATIENT)
Age: 37
End: 2022-06-04

## 2022-10-09 ENCOUNTER — HEALTH MAINTENANCE LETTER (OUTPATIENT)
Age: 37
End: 2022-10-09

## 2022-10-16 ENCOUNTER — MYC REFILL (OUTPATIENT)
Dept: FAMILY MEDICINE | Facility: CLINIC | Age: 37
End: 2022-10-16

## 2022-10-16 DIAGNOSIS — G43.109 MIGRAINE WITH AURA AND WITHOUT STATUS MIGRAINOSUS, NOT INTRACTABLE: ICD-10-CM

## 2022-10-19 NOTE — TELEPHONE ENCOUNTER
"Requested Prescriptions   Pending Prescriptions Disp Refills    naratriptan (AMERGE) 2.5 MG tablet 9 tablet 0       Serotonin Agonists Failed - 10/16/2022  7:29 PM        Failed - Blood pressure under 140/90 in past 12 months     BP Readings from Last 3 Encounters:   07/16/21 118/72   03/22/21 132/81   04/29/20 118/72                 Failed - Serotonin Agonist request needs review.     Please review patient's record. If patient has had 8 or more treatments in the past month, please forward to provider.          Failed - Recent (12 mo) or future (30 days) visit within the authorizing provider's specialty     Patient has had an office visit with the authorizing provider or a provider within the authorizing providers department within the previous 12 mos or has a future within next 30 days. See \"Patient Info\" tab in inbasket, or \"Choose Columns\" in Meds & Orders section of the refill encounter.              Passed - Medication is active on med list        Passed - Patient is age 18 or older        Passed - No active pregnancy on record        Passed - No positive pregnancy test in past 12 months               RENATA CastroN, RN          "

## 2022-10-20 RX ORDER — NARATRIPTAN 2.5 MG/1
TABLET ORAL
Qty: 9 TABLET | Refills: 0 | Status: SHIPPED | OUTPATIENT
Start: 2022-10-20 | End: 2023-05-25

## 2022-12-02 NOTE — PROGRESS NOTES
"  SUBJECTIVE:   Martin Daigle is a 33 year old female who presents to clinic today for the following health issues:      ED/UC Followup:    Facility:  [facility]  Date of visit: 4/29/2018  Reason for visit: strep, ears, migraine,   Current Status: better,but her anxiety is terrible.              Problem list and histories reviewed & adjusted, as indicated.  Additional history: as documented        Reviewed and updated as needed this visit by clinical staff  Tobacco  Allergies  Meds  Problems  Med Hx  Soc Hx      Reviewed and updated as needed this visit by Provider  Allergies  Meds  Problems         Patient here today for follow-up on her ER visit from a month ago.  She was treated for strep throat in the ER doctor noted she had severe perforation of her right eardrum.  She notes that she saw Dr. Miranda about a year and a half ago for evaluation of her eardrum and she was offered more myringoplasty versus hearing aid.  She feels that her hearing is good enough and she does not feel that she needs to pursue either at this time.    Patient would like to discuss her anxiety symptoms today.  She is not sure what to do with regards to them.  She thinks it might have to do with stress but also her headaches that she gets.  She feels like she has anxiety related to not knowing when her headaches are going to show up.    She feels that headaches start in her back and work their way up to back of her head.      ROS:  10 point ROS of systems including Constitutional, Eyes, HENT, Respiratory, Cardiovascular, Gastroenterology, Genitourinary, Integumentary, Muscularskeletal, Psychiatric were all negative except for pertinent positives noted in my HPI.     OBJECTIVE:   /62  Pulse 106  Temp 98.2  F (36.8  C) (Temporal)  Resp 18  Ht 5' 2\" (1.575 m)  Wt 165 lb (74.8 kg)  LMP 05/12/2018  SpO2 100%  Breastfeeding? No  BMI 30.18 kg/m2  Body mass index is 30.18 kg/(m^2).  Physical Exam   Constitutional: She is " oriented to person, place, and time. She appears well-developed and well-nourished.   HENT:   Right Ear: Tympanic membrane is perforated.   Left Ear: Tympanic membrane is not perforated.   Cardiovascular: Normal rate, regular rhythm, S1 normal, S2 normal and normal heart sounds.    No murmur heard.  Pulmonary/Chest: Effort normal and breath sounds normal. No respiratory distress. She has no wheezes. She has no rhonchi. She has no rales.   Musculoskeletal:   There is palpable muscle spasm noted in the bilateral trapezius muscles with tenderness to palpation.  Pain radiates up to the occiput.   Neurological: She is alert and oriented to person, place, and time. She has normal strength and normal reflexes. No cranial nerve deficit or sensory deficit.   Psychiatric: She has a normal mood and affect. Her speech is normal and behavior is normal. Judgment normal. Cognition and memory are normal.         ASSESSMENT/PLAN:       ICD-10-CM    1. Anxiety F41.9    2. Episodic tension-type headache, not intractable G44.219    3. Perforation of right tympanic membrane H72.91      PLAN:  1.  We discussed patient's tension headaches.  I recommend that she seek chiropractic care.  Otherwise, she could also ask for a referral to physical therapy to help manage her headaches.  2.  We discussed that patient's anxiety is likely related to her headaches and not knowing when they will arrive.  I do not feel that medications are appropriate at this time as it is likely her anxiety will improve with her headache management.  She agrees with this assessment and she will contact me for follow-up if her anxiety symptoms worsen despite improvement with her headaches.  3.  Follow-up with Dr. Miranda as needed for management of her perforated right tympanic membrane.    I spent > 25 minutes of face to face time with the patient, >50% of which was spent counseling and coordination of care regarding management of both anxiety symptoms and how  they relate to her physical conditions.  .   Jerald Fenton MD   [facility]    Bexarotene Pregnancy And Lactation Text: This medication is Pregnancy Category X and should not be given to women who are pregnant or may become pregnant. This medication should not be used if you are breast feeding.

## 2023-02-27 ASSESSMENT — ANXIETY QUESTIONNAIRES
2. NOT BEING ABLE TO STOP OR CONTROL WORRYING: NEARLY EVERY DAY
6. BECOMING EASILY ANNOYED OR IRRITABLE: NEARLY EVERY DAY
3. WORRYING TOO MUCH ABOUT DIFFERENT THINGS: NEARLY EVERY DAY
5. BEING SO RESTLESS THAT IT IS HARD TO SIT STILL: SEVERAL DAYS
8. IF YOU CHECKED OFF ANY PROBLEMS, HOW DIFFICULT HAVE THESE MADE IT FOR YOU TO DO YOUR WORK, TAKE CARE OF THINGS AT HOME, OR GET ALONG WITH OTHER PEOPLE?: SOMEWHAT DIFFICULT
7. FEELING AFRAID AS IF SOMETHING AWFUL MIGHT HAPPEN: MORE THAN HALF THE DAYS
IF YOU CHECKED OFF ANY PROBLEMS ON THIS QUESTIONNAIRE, HOW DIFFICULT HAVE THESE PROBLEMS MADE IT FOR YOU TO DO YOUR WORK, TAKE CARE OF THINGS AT HOME, OR GET ALONG WITH OTHER PEOPLE: SOMEWHAT DIFFICULT
1. FEELING NERVOUS, ANXIOUS, OR ON EDGE: NEARLY EVERY DAY
GAD7 TOTAL SCORE: 17
7. FEELING AFRAID AS IF SOMETHING AWFUL MIGHT HAPPEN: MORE THAN HALF THE DAYS
4. TROUBLE RELAXING: MORE THAN HALF THE DAYS

## 2023-02-27 ASSESSMENT — PATIENT HEALTH QUESTIONNAIRE - PHQ9
SUM OF ALL RESPONSES TO PHQ QUESTIONS 1-9: 7
SUM OF ALL RESPONSES TO PHQ QUESTIONS 1-9: 7
10. IF YOU CHECKED OFF ANY PROBLEMS, HOW DIFFICULT HAVE THESE PROBLEMS MADE IT FOR YOU TO DO YOUR WORK, TAKE CARE OF THINGS AT HOME, OR GET ALONG WITH OTHER PEOPLE: SOMEWHAT DIFFICULT

## 2023-02-28 ENCOUNTER — MYC MEDICAL ADVICE (OUTPATIENT)
Dept: FAMILY MEDICINE | Facility: CLINIC | Age: 38
End: 2023-02-28

## 2023-02-28 ENCOUNTER — OFFICE VISIT (OUTPATIENT)
Dept: FAMILY MEDICINE | Facility: CLINIC | Age: 38
End: 2023-02-28
Payer: COMMERCIAL

## 2023-02-28 VITALS
OXYGEN SATURATION: 97 % | BODY MASS INDEX: 33.16 KG/M2 | TEMPERATURE: 97.6 F | DIASTOLIC BLOOD PRESSURE: 70 MMHG | WEIGHT: 187.13 LBS | RESPIRATION RATE: 18 BRPM | HEART RATE: 88 BPM | HEIGHT: 63 IN | SYSTOLIC BLOOD PRESSURE: 120 MMHG

## 2023-02-28 DIAGNOSIS — R06.83 SNORING: ICD-10-CM

## 2023-02-28 DIAGNOSIS — R40.0 DAYTIME SOMNOLENCE: ICD-10-CM

## 2023-02-28 DIAGNOSIS — Z11.59 NEED FOR HEPATITIS C SCREENING TEST: ICD-10-CM

## 2023-02-28 DIAGNOSIS — R09.A2 GLOBUS SENSATION: ICD-10-CM

## 2023-02-28 DIAGNOSIS — F41.9 ANXIETY: Primary | ICD-10-CM

## 2023-02-28 LAB
ERYTHROCYTE [DISTWIDTH] IN BLOOD BY AUTOMATED COUNT: 13 % (ref 10–15)
HCT VFR BLD AUTO: 41.7 % (ref 35–47)
HGB BLD-MCNC: 13.4 G/DL (ref 11.7–15.7)
MCH RBC QN AUTO: 28 PG (ref 26.5–33)
MCHC RBC AUTO-ENTMCNC: 32.1 G/DL (ref 31.5–36.5)
MCV RBC AUTO: 87 FL (ref 78–100)
PLATELET # BLD AUTO: 412 10E3/UL (ref 150–450)
RBC # BLD AUTO: 4.79 10E6/UL (ref 3.8–5.2)
WBC # BLD AUTO: 10 10E3/UL (ref 4–11)

## 2023-02-28 PROCEDURE — 86803 HEPATITIS C AB TEST: CPT | Performed by: FAMILY MEDICINE

## 2023-02-28 PROCEDURE — 36415 COLL VENOUS BLD VENIPUNCTURE: CPT | Performed by: FAMILY MEDICINE

## 2023-02-28 PROCEDURE — 85027 COMPLETE CBC AUTOMATED: CPT | Performed by: FAMILY MEDICINE

## 2023-02-28 PROCEDURE — 99214 OFFICE O/P EST MOD 30 MIN: CPT | Performed by: FAMILY MEDICINE

## 2023-02-28 PROCEDURE — 84443 ASSAY THYROID STIM HORMONE: CPT | Performed by: FAMILY MEDICINE

## 2023-02-28 ASSESSMENT — PATIENT HEALTH QUESTIONNAIRE - PHQ9
SUM OF ALL RESPONSES TO PHQ QUESTIONS 1-9: 7
10. IF YOU CHECKED OFF ANY PROBLEMS, HOW DIFFICULT HAVE THESE PROBLEMS MADE IT FOR YOU TO DO YOUR WORK, TAKE CARE OF THINGS AT HOME, OR GET ALONG WITH OTHER PEOPLE: SOMEWHAT DIFFICULT

## 2023-02-28 ASSESSMENT — ENCOUNTER SYMPTOMS: HEADACHES: 1

## 2023-02-28 ASSESSMENT — PAIN SCALES - GENERAL: PAINLEVEL: NO PAIN (0)

## 2023-02-28 ASSESSMENT — ANXIETY QUESTIONNAIRES: GAD7 TOTAL SCORE: 17

## 2023-02-28 NOTE — PATIENT INSTRUCTIONS
[email or web address]    SMART goal for lifestyle changes:    S = specific  M = measurable  A = actionable  R = relevant  T = time bound    Write down your goal in a positive term for a set amount of time.

## 2023-02-28 NOTE — PROGRESS NOTES
"  Assessment & Plan     ASSESSMENT/ORDERS:    ICD-10-CM    1. Anxiety, unspecified  F41.9       2. Snoring  R06.83 Adult Sleep Eval & Management  Referral     TSH with free T4 reflex     CBC with platelets     TSH with free T4 reflex     CBC with platelets      3. Daytime somnolence  R40.0 Adult Sleep Eval & Management  Referral     TSH with free T4 reflex     CBC with platelets     TSH with free T4 reflex     CBC with platelets      4. Globus sensation  R09.89       5. Need for hepatitis C screening test  Z11.59 Hepatitis C Screen Reflex to HCV RNA Quant and Genotype     Hepatitis C Screen Reflex to HCV RNA Quant and Genotype        PLAN:  1.  Sleep medicine referral placed for evaluation of snoring and possible cause of headache. Will also check TSH and CBC.   2. Discussed that increased anxiety likely contributing to globus sensation. Patient would like to try non-pharmaceutical options for anxiety first and will return if her symptoms do not improve or worsen.   3.  See below for patient instructions for recommendations and discussion on goal setting for lifestyle management and sleep improvement.     Patient Instructions   [email or web address]    SMART goal for lifestyle changes:    S = specific  M = measurable  A = actionable  R = relevant  T = time bound    Write down your goal in a positive term for a set amount of time.                   BMI:   Estimated body mass index is 33.15 kg/m  as calculated from the following:    Height as of this encounter: 1.6 m (5' 3\").    Weight as of this encounter: 84.9 kg (187 lb 2 oz).   Weight management plan: Patient referred to endocrine and/or weight management specialty        No follow-ups on file.    Patient was seen and examined by myself and Jerald Fenton MD. The note was then scribed by me.     Emy Pearson, MS3  [employer]    February 28, 2023    This patient was seen and examined by " myself as well as the medical student.  The medical student scribed the note and I have reviewed it, edited it appropriately, and agree with the final documentation.     Jerald Fenton MD  [facility]    Jill Salazar is a 38 year old, presenting for the following health issues:  Headache    Patient waking up with daily headaches. Has history of migraines that have been well controlled with as needed naratriptan (Amerge). Tried changing pillows with some relief. Has been told she snores in her sleep. Unsure if she is waking up gasping but does have episodes of shortness of breath and anxiety at night.     Anxiety has been worsening in last month. Occasionally having shortness of breath during the day. Does not feel like she is having panic attacks. Thinks poor sleep could be contributing to anxiety. Has used Calm herb for mindfulness when she needs it. Has not needed medications for anxiety or depression in the past.     Also noted sensation of a lump at the front of her throat. Made it difficult to swallow foods. Resolved on its own in a couple of days. Did notice increased stress and anxiety around the same time.     Headache     History of Present Illness       Mental Health Follow-up:  Patient presents to follow-up on Depression & Anxiety.Patient's depression since last visit has been:  Medium  The patient is having other symptoms associated with depression.  Patient's anxiety since last visit has been:  Medium  The patient is having other symptoms associated with anxiety.  Any significant life events: relationship concerns, job concerns, financial concerns and health concerns  Patient is feeling anxious or having panic attacks.  Patient has no concerns about alcohol or drug use.    Headaches:   Since the patient's last clinic visit, headaches are: no change  The patient is getting headaches:  Migraines it has been a couple weeks, since my last one, but I wake up almost  "everyday with somewhat of a little headache  She is able to do normal daily activities when she has a migraine.  The patient is taking the following rescue/relief medications:  Tylenol and other   Patient states \"I get some relief\" from the rescue/relief medications.   The patient is taking the following medications to prevent migraines:  No medications to prevent migraines  In the past 4 weeks, the patient has gone to an Urgent Care or Emergency Room 0 times times due to headaches.    Reason for visit:  To check in about my anxiety, depression and trying to get help so I quit feeling not good! It has been going on about a month now, of not feeling like myself!    She eats 2-3 servings of fruits and vegetables daily.She consumes 1 sweetened beverage(s) daily.She exercises with enough effort to increase her heart rate 10 to 19 minutes per day.  She exercises with enough effort to increase her heart rate 3 or less days per week.   She is taking medications regularly.    Today's PHQ-9         PHQ-9 Total Score: 7    PHQ-9 Q9 Thoughts of better off dead/self-harm past 2 weeks :   Not at all    How difficult have these problems made it for you to do your work, take care of things at home, or get along with other people: Somewhat difficult  Today's KANE-7 Score: 17             Review of Systems   Neurological: Positive for headaches.            Objective    /70   Pulse 88   Temp 97.6  F (36.4  C) (Temporal)   Resp 18   Ht 1.6 m (5' 3\")   Wt 84.9 kg (187 lb 2 oz)   LMP 02/26/2023   SpO2 97%   BMI 33.15 kg/m    Body mass index is 33.15 kg/m .  Physical Exam   GENERAL: healthy, alert and no distress  RESP: lungs clear to auscultation - no rales, rhonchi or wheezes  CV: regular rate and rhythm, normal S1 S2, no S3 or S4, no murmur, click or rub, no peripheral edema and peripheral pulses strong  MS: no gross musculoskeletal defects noted, no edema  PSYCH: mentation appears normal, affect normal/bright    Results " for orders placed or performed in visit on 02/28/23 (from the past 24 hour(s))   CBC with platelets   Result Value Ref Range    WBC Count 10.0 4.0 - 11.0 10e3/uL    RBC Count 4.79 3.80 - 5.20 10e6/uL    Hemoglobin 13.4 11.7 - 15.7 g/dL    Hematocrit 41.7 35.0 - 47.0 %    MCV 87 78 - 100 fL    MCH 28.0 26.5 - 33.0 pg    MCHC 32.1 31.5 - 36.5 g/dL    RDW 13.0 10.0 - 15.0 %    Platelet Count 412 150 - 450 10e3/uL

## 2023-03-01 LAB
HCV AB SERPL QL IA: NONREACTIVE
TSH SERPL DL<=0.005 MIU/L-ACNC: 2.06 UIU/ML (ref 0.3–4.2)

## 2023-03-10 NOTE — RESULT ENCOUNTER NOTE
Martin,  Your results are normal.  Please let me know if you have any questions.    Sincerely,  Dr. Fenton

## 2023-05-25 ENCOUNTER — MYC REFILL (OUTPATIENT)
Dept: FAMILY MEDICINE | Facility: CLINIC | Age: 38
End: 2023-05-25
Payer: COMMERCIAL

## 2023-05-25 DIAGNOSIS — G43.109 MIGRAINE WITH AURA AND WITHOUT STATUS MIGRAINOSUS, NOT INTRACTABLE: ICD-10-CM

## 2023-05-26 RX ORDER — NARATRIPTAN 2.5 MG/1
TABLET ORAL
Qty: 9 TABLET | Refills: 1 | Status: SHIPPED | OUTPATIENT
Start: 2023-05-26

## 2023-05-26 NOTE — TELEPHONE ENCOUNTER
Prescription approved per [facility] Refill Protocol.  Jerrica Coto RN on 5/26/2023 at 10:47 AM

## 2023-06-10 ENCOUNTER — HEALTH MAINTENANCE LETTER (OUTPATIENT)
Age: 38
End: 2023-06-10

## 2023-08-28 ENCOUNTER — MYC MEDICAL ADVICE (OUTPATIENT)
Dept: FAMILY MEDICINE | Facility: CLINIC | Age: 38
End: 2023-08-28
Payer: COMMERCIAL

## 2023-08-28 DIAGNOSIS — F41.9 ANXIETY: Primary | ICD-10-CM

## 2023-08-31 NOTE — TELEPHONE ENCOUNTER
Sertraline sent to pharmacy.  Recommend 1 month telephone, video, or office-based visit follow-up to review results.  If she has specific questions about new medication, then appointment would be recommended ahead of new medication start.    Will have staff notify patient.    Jerald Fenton MD

## 2023-09-01 ENCOUNTER — HOSPITAL ENCOUNTER (EMERGENCY)
Facility: CLINIC | Age: 38
Discharge: HOME | End: 2023-09-01
Attending: EMERGENCY MEDICINE | Admitting: EMERGENCY MEDICINE
Payer: COMMERCIAL

## 2023-09-01 ENCOUNTER — APPOINTMENT (OUTPATIENT)
Dept: CT IMAGING | Facility: CLINIC | Age: 38
End: 2023-09-01
Attending: EMERGENCY MEDICINE
Payer: COMMERCIAL

## 2023-09-01 VITALS
OXYGEN SATURATION: 100 % | TEMPERATURE: 98.3 F | HEART RATE: 88 BPM | HEIGHT: 62 IN | SYSTOLIC BLOOD PRESSURE: 139 MMHG | WEIGHT: 175 LBS | BODY MASS INDEX: 32.2 KG/M2 | DIASTOLIC BLOOD PRESSURE: 87 MMHG | RESPIRATION RATE: 18 BRPM

## 2023-09-01 DIAGNOSIS — K52.9 NON-SPECIFIC COLITIS: ICD-10-CM

## 2023-09-01 LAB
ALBUMIN SERPL BCG-MCNC: 4.5 G/DL (ref 3.5–5.2)
ALBUMIN UR-MCNC: NEGATIVE MG/DL
ALP SERPL-CCNC: 75 U/L (ref 35–104)
ALT SERPL W P-5'-P-CCNC: 21 U/L (ref 0–50)
ANION GAP SERPL CALCULATED.3IONS-SCNC: 13 MMOL/L (ref 7–15)
APPEARANCE UR: CLEAR
AST SERPL W P-5'-P-CCNC: 20 U/L (ref 0–45)
BASOPHILS # BLD AUTO: 0.1 10E3/UL (ref 0–0.2)
BASOPHILS NFR BLD AUTO: 1 %
BILIRUB SERPL-MCNC: 0.3 MG/DL
BILIRUB UR QL STRIP: NEGATIVE
BUN SERPL-MCNC: 10.2 MG/DL (ref 6–20)
CALCIUM SERPL-MCNC: 9.6 MG/DL (ref 8.6–10)
CHLORIDE SERPL-SCNC: 103 MMOL/L (ref 98–107)
COLOR UR AUTO: ABNORMAL
CREAT SERPL-MCNC: 0.89 MG/DL (ref 0.51–0.95)
DEPRECATED HCO3 PLAS-SCNC: 24 MMOL/L (ref 22–29)
EOSINOPHIL # BLD AUTO: 0.2 10E3/UL (ref 0–0.7)
EOSINOPHIL NFR BLD AUTO: 2 %
ERYTHROCYTE [DISTWIDTH] IN BLOOD BY AUTOMATED COUNT: 13 % (ref 10–15)
GFR SERPL CREATININE-BSD FRML MDRD: 86 ML/MIN/1.73M2
GLUCOSE SERPL-MCNC: 98 MG/DL (ref 70–99)
GLUCOSE UR STRIP-MCNC: NEGATIVE MG/DL
HCG UR QL: NEGATIVE
HCT VFR BLD AUTO: 42.5 % (ref 35–47)
HGB BLD-MCNC: 14 G/DL (ref 11.7–15.7)
HGB UR QL STRIP: NEGATIVE
IMM GRANULOCYTES # BLD: 0 10E3/UL
IMM GRANULOCYTES NFR BLD: 0 %
KETONES UR STRIP-MCNC: NEGATIVE MG/DL
LEUKOCYTE ESTERASE UR QL STRIP: NEGATIVE
LIPASE SERPL-CCNC: 14 U/L (ref 13–60)
LYMPHOCYTES # BLD AUTO: 1.7 10E3/UL (ref 0.8–5.3)
LYMPHOCYTES NFR BLD AUTO: 18 %
MCH RBC QN AUTO: 27.9 PG (ref 26.5–33)
MCHC RBC AUTO-ENTMCNC: 32.9 G/DL (ref 31.5–36.5)
MCV RBC AUTO: 85 FL (ref 78–100)
MONOCYTES # BLD AUTO: 0.7 10E3/UL (ref 0–1.3)
MONOCYTES NFR BLD AUTO: 8 %
MUCOUS THREADS #/AREA URNS LPF: PRESENT /LPF
NEUTROPHILS # BLD AUTO: 6.7 10E3/UL (ref 1.6–8.3)
NEUTROPHILS NFR BLD AUTO: 71 %
NITRATE UR QL: NEGATIVE
NRBC # BLD AUTO: 0 10E3/UL
NRBC BLD AUTO-RTO: 0 /100
PH UR STRIP: 8 [PH] (ref 5–7)
PLATELET # BLD AUTO: 461 10E3/UL (ref 150–450)
POTASSIUM SERPL-SCNC: 4.2 MMOL/L (ref 3.4–5.3)
PROT SERPL-MCNC: 7.6 G/DL (ref 6.4–8.3)
RBC # BLD AUTO: 5.02 10E6/UL (ref 3.8–5.2)
RBC URINE: 0 /HPF
SODIUM SERPL-SCNC: 140 MMOL/L (ref 136–145)
SP GR UR STRIP: 1.01 (ref 1–1.03)
SQUAMOUS EPITHELIAL: <1 /HPF
UROBILINOGEN UR STRIP-MCNC: NORMAL MG/DL
WBC # BLD AUTO: 9.5 10E3/UL (ref 4–11)
WBC URINE: <1 /HPF

## 2023-09-01 PROCEDURE — 96361 HYDRATE IV INFUSION ADD-ON: CPT

## 2023-09-01 PROCEDURE — 85025 COMPLETE CBC W/AUTO DIFF WBC: CPT | Performed by: EMERGENCY MEDICINE

## 2023-09-01 PROCEDURE — 99285 EMERGENCY DEPT VISIT HI MDM: CPT | Mod: 25

## 2023-09-01 PROCEDURE — 81001 URINALYSIS AUTO W/SCOPE: CPT | Performed by: EMERGENCY MEDICINE

## 2023-09-01 PROCEDURE — 81025 URINE PREGNANCY TEST: CPT | Performed by: EMERGENCY MEDICINE

## 2023-09-01 PROCEDURE — 96374 THER/PROPH/DIAG INJ IV PUSH: CPT | Mod: 59

## 2023-09-01 PROCEDURE — 250N000011 HC RX IP 250 OP 636: Performed by: EMERGENCY MEDICINE

## 2023-09-01 PROCEDURE — 99285 EMERGENCY DEPT VISIT HI MDM: CPT | Performed by: EMERGENCY MEDICINE

## 2023-09-01 PROCEDURE — 74177 CT ABD & PELVIS W/CONTRAST: CPT

## 2023-09-01 PROCEDURE — 36415 COLL VENOUS BLD VENIPUNCTURE: CPT | Performed by: EMERGENCY MEDICINE

## 2023-09-01 PROCEDURE — 250N000009 HC RX 250: Performed by: EMERGENCY MEDICINE

## 2023-09-01 PROCEDURE — 258N000003 HC RX IP 258 OP 636: Performed by: EMERGENCY MEDICINE

## 2023-09-01 PROCEDURE — 80053 COMPREHEN METABOLIC PANEL: CPT | Performed by: EMERGENCY MEDICINE

## 2023-09-01 PROCEDURE — 83690 ASSAY OF LIPASE: CPT | Performed by: EMERGENCY MEDICINE

## 2023-09-01 RX ORDER — DICYCLOMINE HCL 20 MG
20 TABLET ORAL 4 TIMES DAILY PRN
Qty: 20 TABLET | Refills: 0 | Status: SHIPPED | OUTPATIENT
Start: 2023-09-01 | End: 2023-09-11

## 2023-09-01 RX ORDER — LORAZEPAM 1 MG/1
0.5 TABLET ORAL EVERY 6 HOURS PRN
Qty: 5 TABLET | Refills: 0 | Status: SHIPPED | OUTPATIENT
Start: 2023-09-01 | End: 2023-09-28

## 2023-09-01 RX ORDER — IOPAMIDOL 755 MG/ML
500 INJECTION, SOLUTION INTRAVASCULAR ONCE
Status: COMPLETED | OUTPATIENT
Start: 2023-09-01 | End: 2023-09-01

## 2023-09-01 RX ORDER — LORAZEPAM 2 MG/ML
1 INJECTION INTRAMUSCULAR ONCE
Status: COMPLETED | OUTPATIENT
Start: 2023-09-01 | End: 2023-09-01

## 2023-09-01 RX ADMIN — SODIUM CHLORIDE 1000 ML: 9 INJECTION, SOLUTION INTRAVENOUS at 12:34

## 2023-09-01 RX ADMIN — LORAZEPAM 1 MG: 2 INJECTION INTRAMUSCULAR; INTRAVENOUS at 12:39

## 2023-09-01 RX ADMIN — IOPAMIDOL 85 ML: 755 INJECTION, SOLUTION INTRAVENOUS at 12:52

## 2023-09-01 RX ADMIN — SODIUM CHLORIDE 60 ML: 9 INJECTION, SOLUTION INTRAVENOUS at 12:52

## 2023-09-01 ASSESSMENT — ACTIVITIES OF DAILY LIVING (ADL)
ADLS_ACUITY_SCORE: 35
ADLS_ACUITY_SCORE: 35

## 2023-09-01 NOTE — DISCHARGE INSTRUCTIONS
CT scan did not show any acute worrisome findings.  You do have mild colitis which might be infectious or inflammatory, but did not see any acute blockage, abscess, or other surgical emergency    The rest of your lab work did not show any acute worrisome findings    I would recommend starting the antibiotic that was prescribed for your ear infection.  Also start the medication that was prescribed for anxiety.  The some time to be effective    You may try the Bentyl up to 4 times daily as needed to help with abdominal pain or cramping    You may take 1/2-1 full tablet of the Ativan every 6 hours as needed to help with acute anxiety.  This may be helpful for short-term use    Follow-up closely with your primary doctor in clinic.  If you continue to have abdominal symptoms, they may recommend a HIDA scan on an outpatient basis for evaluation    You develop any new or worsening symptoms, such as fever, vomiting and inability to keep anything down, or any new symptoms, do not hesitate to return to the emergency room for evaluation

## 2023-09-01 NOTE — ED TRIAGE NOTES
Pt c/o abdominal pain; onset 5 days ago. Pain started in the periumbilcal area and now is present on the right side. Intermittent nausea.   No fevers, no diarrhea or vomiting. Denies urinary symptoms.      Triage Assessment       Row Name 09/01/23 1021       Triage Assessment (Adult)    Airway WDL WDL       Respiratory WDL    Respiratory WDL WDL       Cardiac WDL    Cardiac WDL WDL

## 2023-09-01 NOTE — ED PROVIDER NOTES
History     Chief Complaint   Patient presents with    Abdominal Pain     HPI  Martin Daigle is a 38 year old female who to the emergency room for concern of abdominal pain.  Symptoms started about 5 days ago.  Felt like a bloating sensation in her upper abdomen, and also has pain wrapping around the right side to her back.  Associated with some nausea but has not been vomiting.  Has not been running a fever, no diarrhea or constipation.  No pain or burning with urination.  She says that she was seen yesterday and diagnosed with an ear infection.  Was prescribed an antibiotic, but has not yet picked it up or started the medication.  She does have a slight headache, and has a history of migraines, but this is not as bad as the migraine.  She thought maybe it was due to her recent ear infection diagnosis.  Tried taking some Tylenol today without any relief of her headache or her abdominal pain.  Additionally, notes that she has been feeling quite anxious about all of this, and did recently get a prescription from her primary doctor regarding anxiety, but has not yet started this medication either.  She says the anxiety is making her feel like she cannot breathe or swallow.    Allergies:  Allergies   Allergen Reactions    Penicillins        Problem List:    Patient Active Problem List    Diagnosis Date Noted    Migraine with aura and without status migrainosus, not intractable 10/24/2018     Priority: Medium    Anxiety, unspecified 2018     Priority: Medium    Adjustment disorder with depressed mood 2018     Priority: Medium    Perforation of right tympanic membrane 2018     Priority: Medium    CARDIOVASCULAR SCREENING; LDL GOAL LESS THAN 160 2011     Priority: Medium        Past Medical History:    Past Medical History:   Diagnosis Date    ABNORM ELECTROCARDIOGRAM 2001    ANXIETY STATE NOS 2001    Hx maternal GBS (group B streptococcus) affected , pregnant 2/3/2016     "Syncope and collapse 11/8/2001    Tinea versicolor 12/23/2010       Past Surgical History:    Past Surgical History:   Procedure Laterality Date    ZZC NONSPECIFIC PROCEDURE      release trigger finger right hand    ZZHC CREATE EARDRUM OPENING,GEN ANESTH      P.E. Tubes x 2       Family History:    Family History   Problem Relation Age of Onset    Hypertension Father     Diabetes Father         Diet controlled? Not insulin dependent     Osteoporosis Maternal Grandmother     Heart Disease Maternal Grandfather         cardiac sugery    Diabetes Paternal Grandfather         Insulin Dependent    Cerebrovascular Disease Paternal Grandfather     Allergies Sister        Social History:  Marital Status:   [2]  Social History     Tobacco Use    Smoking status: Never    Smokeless tobacco: Never   Vaping Use    Vaping Use: Never used   Substance Use Topics    Alcohol use: Yes     Alcohol/week: 0.0 standard drinks of alcohol     Comment: \"once in a while\"    Drug use: No        Medications:    dicyclomine (BENTYL) 20 MG tablet  LORazepam (ATIVAN) 1 MG tablet  naratriptan (AMERGE) 2.5 MG tablet  sertraline (ZOLOFT) 50 MG tablet          Review of Systems   All other systems reviewed and are negative.      Physical Exam   BP: (!) 147/103  Pulse: 84  Temp: 98.3  F (36.8  C)  Resp: 18  Height: 157.5 cm (5' 2\")  Weight: 79.4 kg (175 lb)  SpO2: 100 %      Physical Exam  Vitals and nursing note reviewed.   Constitutional:       General: She is not in acute distress.     Appearance: Normal appearance. She is not diaphoretic.   HENT:      Head: Atraumatic.      Mouth/Throat:      Mouth: Mucous membranes are moist.   Eyes:      General: No scleral icterus.     Conjunctiva/sclera: Conjunctivae normal.   Cardiovascular:      Rate and Rhythm: Normal rate.      Heart sounds: Normal heart sounds.   Pulmonary:      Effort: No respiratory distress.      Breath sounds: Normal breath sounds.   Abdominal:      General: Abdomen is flat.      " Tenderness: There is generalized abdominal tenderness (Very mild nonspecific tenderness, palpation does not reproduce or aggravate symptoms).   Musculoskeletal:      Cervical back: Neck supple.   Skin:     General: Skin is warm.      Findings: No rash.   Neurological:      Mental Status: She is alert.         ED Course                 Procedures              Critical Care time:  none               Results for orders placed or performed during the hospital encounter of 09/01/23 (from the past 24 hour(s))   Extra Tube ([place] Draw) *Canceled*    Narrative    The following orders were created for panel order Extra Tube ([place] Draw).  Procedure                               Abnormality         Status                     ---------                               -----------         ------                       Please view results for these tests on the individual orders.   CBC with platelets differential    Narrative    The following orders were created for panel order CBC with platelets differential.  Procedure                               Abnormality         Status                     ---------                               -----------         ------                     CBC with platelets and d...[[id number]]  Abnormal            Final result                 Please view results for these tests on the individual orders.   Comprehensive metabolic panel   Result Value Ref Range    Sodium 140 136 - 145 mmol/L    Potassium 4.2 3.4 - 5.3 mmol/L    Chloride 103 98 - 107 mmol/L    Carbon Dioxide (CO2) 24 22 - 29 mmol/L    Anion Gap 13 7 - 15 mmol/L    Urea Nitrogen 10.2 6.0 - 20.0 mg/dL    Creatinine 0.89 0.51 - 0.95 mg/dL    Calcium 9.6 8.6 - 10.0 mg/dL    Glucose 98 70 - 99 mg/dL    Alkaline Phosphatase 75 35 - 104 U/L    AST 20 0 - 45 U/L    ALT 21 0 - 50 U/L    Protein Total 7.6 6.4 - 8.3 g/dL    Albumin 4.5 3.5 - 5.2 g/dL    Bilirubin Total 0.3 <=1.2 mg/dL    GFR Estimate 86 >60 mL/min/1.73m2   Lipase   Result Value Ref  Range    Lipase 14 13 - 60 U/L   UA with Microscopic reflex to Culture    Specimen: Urine, Midstream   Result Value Ref Range    Color Urine Straw Colorless, Straw, Light Yellow, Yellow    Appearance Urine Clear Clear    Glucose Urine Negative Negative mg/dL    Bilirubin Urine Negative Negative    Ketones Urine Negative Negative mg/dL    Specific Gravity Urine 1.009 1.003 - 1.035    Blood Urine Negative Negative    pH Urine 8.0 (H) 5.0 - 7.0    Protein Albumin Urine Negative Negative mg/dL    Urobilinogen Urine Normal Normal, 2.0 mg/dL    Nitrite Urine Negative Negative    Leukocyte Esterase Urine Negative Negative    Mucus Urine Present (A) None Seen /LPF    RBC Urine 0 <=2 /HPF    WBC Urine <1 <=5 /HPF    Squamous Epithelials Urine <1 <=1 /HPF    Narrative    Urine Culture not indicated   HCG qualitative urine   Result Value Ref Range    hCG Urine Qualitative Negative Negative   CBC with platelets and differential   Result Value Ref Range    WBC Count 9.5 4.0 - 11.0 10e3/uL    RBC Count 5.02 3.80 - 5.20 10e6/uL    Hemoglobin 14.0 11.7 - 15.7 g/dL    Hematocrit 42.5 35.0 - 47.0 %    MCV 85 78 - 100 fL    MCH 27.9 26.5 - 33.0 pg    MCHC 32.9 31.5 - 36.5 g/dL    RDW 13.0 10.0 - 15.0 %    Platelet Count 461 (H) 150 - 450 10e3/uL    % Neutrophils 71 %    % Lymphocytes 18 %    % Monocytes 8 %    % Eosinophils 2 %    % Basophils 1 %    % Immature Granulocytes 0 %    NRBCs per 100 WBC 0 <1 /100    Absolute Neutrophils 6.7 1.6 - 8.3 10e3/uL    Absolute Lymphocytes 1.7 0.8 - 5.3 10e3/uL    Absolute Monocytes 0.7 0.0 - 1.3 10e3/uL    Absolute Eosinophils 0.2 0.0 - 0.7 10e3/uL    Absolute Basophils 0.1 0.0 - 0.2 10e3/uL    Absolute Immature Granulocytes 0.0 <=0.4 10e3/uL    Absolute NRBCs 0.0 10e3/uL   CT Abdomen Pelvis w Contrast    Narrative    CT ABDOMEN AND PELVIS WITH CONTRAST 9/1/2023 1:05 PM    CLINICAL HISTORY: Abdominal pain and bloating. Back pain.    TECHNIQUE: CT scan of the abdomen and pelvis was performed  following  injection of IV contrast. Multiplanar reformats were obtained. Dose  reduction techniques were used.  CONTRAST: Isovue 370, 100 mL  COMPARISON: None.    FINDINGS:   LOWER CHEST: The visualized lung bases are clear.    HEPATOBILIARY: Subcentimeter hypodensity in the right hepatic lobe is  too small to characterize, but could represent a cyst or hemangioma.  No other focal hepatic lesions. No calcified gallstones.    PANCREAS: Normal.    SPLEEN: Normal.    ADRENAL GLANDS: Normal.    KIDNEYS/BLADDER: Left parapelvic cyst would require no specific  follow-up. The kidneys are otherwise unremarkable. No hydronephrosis.    BOWEL: Mild diffuse bowel wall thickening in the transverse,  descending, and sigmoid colon suggesting nonspecific colitis. No bowel  obstruction. The appendix is mildly thickened at 0.8 cm, however,  there is no significant periappendiceal inflammatory change or fluid.    PELVIC ORGANS: The uterus is retroverted. No free fluid in the pelvis.    LYMPH NODES: No enlarged lymph nodes are identified in the abdomen or  pelvis.    VASCULATURE: Unremarkable.    ADDITIONAL FINDINGS: Small fat-containing paraumbilical hernia.    MUSCULOSKELETAL: Unremarkable.      Impression    IMPRESSION:   1.  Mild bowel wall thickening in the transverse, descending, and  sigmoid colon suggesting a nonspecific colitis, most likely infectious  or inflammatory.  2.  The appendix is mildly thickened at 0.8 cm, with no significant  periappendiceal inflammation. Acute appendicitis is considered  unlikely given this appearance, however clinical correlation and  follow-up are recommended.    ELVIN CHEUNG MD         SYSTEM ID:  [id number]       Medications   0.9% sodium chloride BOLUS (0 mLs Intravenous Stopped 9/1/23 1400)   LORazepam (ATIVAN) injection 1 mg (1 mg Intravenous $Given 9/1/23 1239)   iopamidol (ISOVUE-370) solution 500 mL (85 mLs Intravenous $Given 9/1/23 1252)   100 mL saline bag CT (60 mLs Intravenous  $Given 9/1/23 3559)       Assessments & Plan (with Medical Decision Making)  Martin is a 38-year-old female presenting to the emergency room for abdominal pain for the last few days.  See history and focused physical exam as above  38-year-old female in no acute distress, is mildly hypertensive but otherwise vitally stable and well.  She is breathing easily on room air, no audible stridor, no increased work of breathing.  Abdominal exam is generally benign, she does admit to generalized tenderness but palpation does not reproduce or worsen symptoms.  Otherwise no rigidity noted, normal bowel sounds throughout.  Will check blood work and CT scan.  Suspect that some of patient's symptoms are secondary to anxiety, or exacerbated by anxiety, but will also evaluate for underlying additional cause\    Labs and imaging as above.  Blood work is all within acceptable limits.  White blood cell count is within normal limits and LFTs are unremarkable.  CT scan does show mild bowel wall thickening which indicates nonspecific colitis, and appendix does appear mildly thickened, without significant.  Appendiceal inflammation.  Since patient has not been febrile, does not have significant tenderness in the right lower quadrant, and white count is within normal limits, do not suspect acute appendicitis.  Patient does feel a bit better in regards to the anxiety after receiving Ativan.  Still does have some mild abdominal symptoms.  Informed her of the results as above.  Recommend that she start the antibiotic that was prescribed for ear infection, as well as the medication that was prescribed for anxiety.  However, since the sertraline will likely take time to have full effect, we will give her a few tablets of Ativan to help with acute anxiety and panic if she needs them this weekend.  We will also give prescription to help with abdominal pain and cramping, and sent Bentyl to the pharmacy.  If she develops any new or worsening  symptoms advised to return to the ER for prompt evaluation, otherwise can follow-up with her primary doctor in clinic as an outpatient.  All questions were answered.  Discharged in no distress     I have reviewed the nursing notes.    I have reviewed the findings, diagnosis, plan and need for follow up with the patient.           Medical Decision Making  The patient's presentation was of low complexity (an acute and uncomplicated illness or injury).    The patient's evaluation involved:  ordering and/or review of 3+ test(s) in this encounter (see separate area of note for details)    The patient's management necessitated moderate risk (prescription drug management including medications given in the ED).        Discharge Medication List as of 9/1/2023  3:01 PM        START taking these medications    Details   dicyclomine (BENTYL) 20 MG tablet Take 1 tablet (20 mg) by mouth 4 times daily as needed (Abdominal pain, cramping), Disp-20 tablet, R-0, E-Prescribe      LORazepam (ATIVAN) 1 MG tablet Take 0.5 tablets (0.5 mg) by mouth every 6 hours as needed for anxiety, Disp-5 tablet, R-0, E-Prescribe             Final diagnoses:   Non-specific colitis       9/1/2023   [facility] EMERGENCY DEPT       Fauzia Fenton,   09/01/23 6704

## 2023-09-05 ENCOUNTER — HOSPITAL ENCOUNTER (EMERGENCY)
Facility: CLINIC | Age: 38
Discharge: HOME | End: 2023-09-05
Attending: FAMILY MEDICINE | Admitting: FAMILY MEDICINE
Payer: COMMERCIAL

## 2023-09-05 ENCOUNTER — APPOINTMENT (OUTPATIENT)
Dept: ULTRASOUND IMAGING | Facility: CLINIC | Age: 38
End: 2023-09-05
Attending: FAMILY MEDICINE
Payer: COMMERCIAL

## 2023-09-05 VITALS
HEIGHT: 62 IN | BODY MASS INDEX: 32.2 KG/M2 | DIASTOLIC BLOOD PRESSURE: 86 MMHG | HEART RATE: 71 BPM | WEIGHT: 175 LBS | SYSTOLIC BLOOD PRESSURE: 134 MMHG | OXYGEN SATURATION: 98 % | TEMPERATURE: 98.5 F | RESPIRATION RATE: 19 BRPM

## 2023-09-05 DIAGNOSIS — R07.9 CHEST PAIN, UNSPECIFIED TYPE: ICD-10-CM

## 2023-09-05 DIAGNOSIS — R10.10 UPPER ABDOMINAL PAIN: ICD-10-CM

## 2023-09-05 LAB
ALBUMIN SERPL BCG-MCNC: 4.1 G/DL (ref 3.5–5.2)
ALP SERPL-CCNC: 72 U/L (ref 35–104)
ALT SERPL W P-5'-P-CCNC: 18 U/L (ref 0–50)
ANION GAP SERPL CALCULATED.3IONS-SCNC: 12 MMOL/L (ref 7–15)
AST SERPL W P-5'-P-CCNC: 19 U/L (ref 0–45)
BASOPHILS # BLD AUTO: 0.1 10E3/UL (ref 0–0.2)
BASOPHILS NFR BLD AUTO: 1 %
BILIRUB SERPL-MCNC: 0.4 MG/DL
BUN SERPL-MCNC: 10 MG/DL (ref 6–20)
CALCIUM SERPL-MCNC: 8.8 MG/DL (ref 8.6–10)
CHLORIDE SERPL-SCNC: 103 MMOL/L (ref 98–107)
CREAT SERPL-MCNC: 0.9 MG/DL (ref 0.51–0.95)
D DIMER PPP FEU-MCNC: <0.27 UG/ML FEU (ref 0–0.5)
DEPRECATED HCO3 PLAS-SCNC: 24 MMOL/L (ref 22–29)
EOSINOPHIL # BLD AUTO: 0.2 10E3/UL (ref 0–0.7)
EOSINOPHIL NFR BLD AUTO: 2 %
ERYTHROCYTE [DISTWIDTH] IN BLOOD BY AUTOMATED COUNT: 13 % (ref 10–15)
GFR SERPL CREATININE-BSD FRML MDRD: 85 ML/MIN/1.73M2
GLUCOSE SERPL-MCNC: 106 MG/DL (ref 70–99)
HCT VFR BLD AUTO: 38.7 % (ref 35–47)
HGB BLD-MCNC: 13 G/DL (ref 11.7–15.7)
IMM GRANULOCYTES # BLD: 0 10E3/UL
IMM GRANULOCYTES NFR BLD: 0 %
LIPASE SERPL-CCNC: 13 U/L (ref 13–60)
LYMPHOCYTES # BLD AUTO: 1.5 10E3/UL (ref 0.8–5.3)
LYMPHOCYTES NFR BLD AUTO: 16 %
MCH RBC QN AUTO: 28 PG (ref 26.5–33)
MCHC RBC AUTO-ENTMCNC: 33.6 G/DL (ref 31.5–36.5)
MCV RBC AUTO: 83 FL (ref 78–100)
MONOCYTES # BLD AUTO: 0.7 10E3/UL (ref 0–1.3)
MONOCYTES NFR BLD AUTO: 7 %
NEUTROPHILS # BLD AUTO: 6.6 10E3/UL (ref 1.6–8.3)
NEUTROPHILS NFR BLD AUTO: 74 %
NRBC # BLD AUTO: 0 10E3/UL
NRBC BLD AUTO-RTO: 0 /100
PLATELET # BLD AUTO: 426 10E3/UL (ref 150–450)
POTASSIUM SERPL-SCNC: 3.7 MMOL/L (ref 3.4–5.3)
PROT SERPL-MCNC: 6.7 G/DL (ref 6.4–8.3)
RBC # BLD AUTO: 4.64 10E6/UL (ref 3.8–5.2)
SODIUM SERPL-SCNC: 139 MMOL/L (ref 136–145)
TROPONIN T SERPL HS-MCNC: 6 NG/L
WBC # BLD AUTO: 9 10E3/UL (ref 4–11)

## 2023-09-05 PROCEDURE — 93010 ELECTROCARDIOGRAM REPORT: CPT | Performed by: FAMILY MEDICINE

## 2023-09-05 PROCEDURE — 84484 ASSAY OF TROPONIN QUANT: CPT | Performed by: FAMILY MEDICINE

## 2023-09-05 PROCEDURE — 83690 ASSAY OF LIPASE: CPT | Performed by: FAMILY MEDICINE

## 2023-09-05 PROCEDURE — 85025 COMPLETE CBC W/AUTO DIFF WBC: CPT | Performed by: FAMILY MEDICINE

## 2023-09-05 PROCEDURE — 93005 ELECTROCARDIOGRAM TRACING: CPT | Performed by: FAMILY MEDICINE

## 2023-09-05 PROCEDURE — 99285 EMERGENCY DEPT VISIT HI MDM: CPT | Mod: 25 | Performed by: FAMILY MEDICINE

## 2023-09-05 PROCEDURE — 80053 COMPREHEN METABOLIC PANEL: CPT | Performed by: FAMILY MEDICINE

## 2023-09-05 PROCEDURE — 85379 FIBRIN DEGRADATION QUANT: CPT | Performed by: FAMILY MEDICINE

## 2023-09-05 PROCEDURE — 36415 COLL VENOUS BLD VENIPUNCTURE: CPT | Performed by: FAMILY MEDICINE

## 2023-09-05 PROCEDURE — 99284 EMERGENCY DEPT VISIT MOD MDM: CPT | Mod: 25 | Performed by: FAMILY MEDICINE

## 2023-09-05 PROCEDURE — 76705 ECHO EXAM OF ABDOMEN: CPT

## 2023-09-05 PROCEDURE — 250N000013 HC RX MED GY IP 250 OP 250 PS 637: Performed by: FAMILY MEDICINE

## 2023-09-05 RX ORDER — OMEPRAZOLE 40 MG/1
40 CAPSULE, DELAYED RELEASE ORAL DAILY
Qty: 14 CAPSULE | Refills: 0 | Status: SHIPPED | OUTPATIENT
Start: 2023-09-05 | End: 2024-03-11

## 2023-09-05 RX ORDER — MAGNESIUM HYDROXIDE/ALUMINUM HYDROXICE/SIMETHICONE 120; 1200; 1200 MG/30ML; MG/30ML; MG/30ML
15 SUSPENSION ORAL ONCE
Status: COMPLETED | OUTPATIENT
Start: 2023-09-05 | End: 2023-09-05

## 2023-09-05 RX ORDER — ONDANSETRON 4 MG/1
4 TABLET, ORALLY DISINTEGRATING ORAL EVERY 8 HOURS PRN
Qty: 15 TABLET | Refills: 0 | Status: SHIPPED | OUTPATIENT
Start: 2023-09-05 | End: 2023-09-19

## 2023-09-05 RX ADMIN — ALUMINUM HYDROXIDE, MAGNESIUM HYDROXIDE, AND SIMETHICONE 15 ML: 200; 200; 20 SUSPENSION ORAL at 06:47

## 2023-09-05 ASSESSMENT — ACTIVITIES OF DAILY LIVING (ADL)
ADLS_ACUITY_SCORE: 35
ADLS_ACUITY_SCORE: 35

## 2023-09-05 NOTE — ED PROVIDER NOTES
38-year-old female signed out to me at change of shift from Dr. Darnell.  Please see his note for further details.  Briefly she is a 38-year-old female who presents to the ED secondary to epigastric discomfort radiating to her chest and her scapula.  Work-up was for the most part negative.  LFTs were normal.  Gallbladder ultrasound was pending at the time of sign over.  That was read as normal.  Plan per Dr. Darnell was to start the patient on omeprazole daily and follow-up as an outpatient if the ultrasound was negative.  I prescribed Zofran and omeprazole.  I advised her that she should get an upper endoscopy if symptoms persist despite the above treatment.  Return to ER precautions and fall precautions discussed.  All questions answered prior to discharge.     Gurpreet Guardado MD  09/05/23 4036

## 2023-09-05 NOTE — ED TRIAGE NOTES
"Pt states that she has been experiencing a \"burning sensation\" across her chest. Woke pt up from sleep at 0330. Was diaphoretic at that time also. Pain in left shoulder blade. \"Squeezing feeling across front of forehead that comes and goes.      Triage Assessment       Row Name 09/05/23 0602       Triage Assessment (Adult)    Airway WDL WDL       Respiratory WDL    Respiratory WDL X;all    Rhythm/Pattern, Respiratory shortness of breath       Skin Circulation/Temperature WDL    Skin Circulation/Temperature WDL WDL       Cardiac WDL    Cardiac WDL WDL       Peripheral/Neurovascular WDL    Peripheral Neurovascular WDL WDL       Cognitive/Neuro/Behavioral WDL    Cognitive/Neuro/Behavioral WDL WDL                    "

## 2023-09-05 NOTE — ED PROVIDER NOTES
History   CC: chest pain    HPI  Martin Daigle is a 38 year old female who presents to the ED this morning after waking up at about 330 this morning with epigastric pain radiating to her chest.  She was sweaty and shaky.  Then developed tightness and a burning sensation across her upper chest and now has some frontal headache/pressure.  Felt a little lightheaded.  Blames some of this on her anxiety.  Also has pain into the left scapula.  Slight nausea but no vomiting.    Blood pressures been elevated the last few times she has been in.  She is not diabetic,?  Cholesterol, non-smoker, family history significant for coronary artery disease in her father in his early 60s.  He also has hypertension and hyperlipidemia.    She was just seen in the ER a few days ago with nonspecific colitis.  Has had some epigastric pain leading up to that but did not have pain up into her chest that time.  Has used Prilosec on occasion but not regularly.    Allergies:  Allergies   Allergen Reactions    Penicillins        Problem List:    Patient Active Problem List    Diagnosis Date Noted    Migraine with aura and without status migrainosus, not intractable 10/24/2018     Priority: Medium    Anxiety, unspecified 2018     Priority: Medium    Adjustment disorder with depressed mood 2018     Priority: Medium    Perforation of right tympanic membrane 2018     Priority: Medium    CARDIOVASCULAR SCREENING; LDL GOAL LESS THAN 160 2011     Priority: Medium        Past Medical History:    Past Medical History:   Diagnosis Date    ABNORM ELECTROCARDIOGRAM 2001    ANXIETY STATE NOS 2001    Hx maternal GBS (group B streptococcus) affected , pregnant 2/3/2016    Syncope and collapse 2001    Tinea versicolor 2010       Past Surgical History:    Past Surgical History:   Procedure Laterality Date    ZZC NONSPECIFIC PROCEDURE      release trigger finger right hand    ZZHC CREATE EARDRUM OPENING,GEN  "ANESTH      P.E. Tubes x 2       Family History:    Family History   Problem Relation Age of Onset    Hypertension Father     Diabetes Father         Diet controlled? Not insulin dependent     Osteoporosis Maternal Grandmother     Heart Disease Maternal Grandfather         cardiac sugery    Diabetes Paternal Grandfather         Insulin Dependent    Cerebrovascular Disease Paternal Grandfather     Allergies Sister        Social History:  Marital Status:   [2]  Social History     Tobacco Use    Smoking status: Never    Smokeless tobacco: Never   Vaping Use    Vaping Use: Never used   Substance Use Topics    Alcohol use: Yes     Alcohol/week: 0.0 standard drinks of alcohol     Comment: \"once in a while\"    Drug use: No        Medications:    naratriptan (AMERGE) 2.5 MG tablet  sertraline (ZOLOFT) 50 MG tablet  dicyclomine (BENTYL) 20 MG tablet  LORazepam (ATIVAN) 1 MG tablet          Review of Systems   All other systems reviewed and are negative.      Physical Exam   BP: (!) 150/91  Pulse: 76  Temp: 98.5  F (36.9  C)  Resp: 19  Height: 157.5 cm (5' 2\")  Weight: 79.4 kg (175 lb)  SpO2: 97 %      Physical Exam  Constitutional:       Appearance: Normal appearance.   HENT:      Mouth/Throat:      Mouth: Mucous membranes are moist.      Pharynx: Oropharynx is clear.   Eyes:      Extraocular Movements: Extraocular movements intact.      Pupils: Pupils are equal, round, and reactive to light.   Cardiovascular:      Rate and Rhythm: Normal rate and regular rhythm.      Pulses: Normal pulses.   Pulmonary:      Effort: Pulmonary effort is normal.      Breath sounds: Normal breath sounds.   Abdominal:      Palpations: Abdomen is soft.      Tenderness: There is abdominal tenderness (mild/mod epigastric).   Musculoskeletal:         General: Normal range of motion.      Right lower leg: No edema.      Left lower leg: No edema.   Skin:     General: Skin is warm and dry.      Findings: No rash.   Neurological:      General: No " focal deficit present.      Mental Status: She is alert and oriented to person, place, and time.         ED Course                 Procedures                 EKG Interpretation:      Interpreted by Boris Ayon MD  Time reviewed:0630   Symptoms at time of EKG: chest pain   Rhythm: Normal sinus   Rate: 85  Axis: Normal  Ectopy: None  Conduction: Normal  ST Segments/ T Waves: Nonspecific T wave flattening  Q Waves: None  Comparison to prior: Unchanged from 4/9/2018    Clinical Impression: no acute changes      Critical Care time:  none               Results for orders placed or performed during the hospital encounter of 09/05/23 (from the past 24 hour(s))   CBC with platelets differential    Narrative    The following orders were created for panel order CBC with platelets differential.  Procedure                               Abnormality         Status                     ---------                               -----------         ------                     CBC with platelets and d...[[id number]]                      Final result                 Please view results for these tests on the individual orders.   D dimer quantitative   Result Value Ref Range    D-Dimer Quantitative <0.27 0.00 - 0.50 ug/mL FEU    Narrative    This D-dimer assay is intended for use in conjunction with a clinical pretest probability assessment model to exclude pulmonary embolism (PE) and deep venous thrombosis (DVT) in outpatients suspected of PE or DVT. The cut-off value is 0.50 ug/mL FEU.   Comprehensive metabolic panel   Result Value Ref Range    Sodium 139 136 - 145 mmol/L    Potassium 3.7 3.4 - 5.3 mmol/L    Chloride 103 98 - 107 mmol/L    Carbon Dioxide (CO2) 24 22 - 29 mmol/L    Anion Gap 12 7 - 15 mmol/L    Urea Nitrogen 10.0 6.0 - 20.0 mg/dL    Creatinine 0.90 0.51 - 0.95 mg/dL    Calcium 8.8 8.6 - 10.0 mg/dL    Glucose 106 (H) 70 - 99 mg/dL    Alkaline Phosphatase 72 35 - 104 U/L    AST 19 0 - 45 U/L    ALT 18 0 - 50 U/L     Protein Total 6.7 6.4 - 8.3 g/dL    Albumin 4.1 3.5 - 5.2 g/dL    Bilirubin Total 0.4 <=1.2 mg/dL    GFR Estimate 85 >60 mL/min/1.73m2   Lipase   Result Value Ref Range    Lipase 13 13 - 60 U/L   Troponin T, High Sensitivity   Result Value Ref Range    Troponin T, High Sensitivity 6 <=14 ng/L   CBC with platelets and differential   Result Value Ref Range    WBC Count 9.0 4.0 - 11.0 10e3/uL    RBC Count 4.64 3.80 - 5.20 10e6/uL    Hemoglobin 13.0 11.7 - 15.7 g/dL    Hematocrit 38.7 35.0 - 47.0 %    MCV 83 78 - 100 fL    MCH 28.0 26.5 - 33.0 pg    MCHC 33.6 31.5 - 36.5 g/dL    RDW 13.0 10.0 - 15.0 %    Platelet Count 426 150 - 450 10e3/uL    % Neutrophils 74 %    % Lymphocytes 16 %    % Monocytes 7 %    % Eosinophils 2 %    % Basophils 1 %    % Immature Granulocytes 0 %    NRBCs per 100 WBC 0 <1 /100    Absolute Neutrophils 6.6 1.6 - 8.3 10e3/uL    Absolute Lymphocytes 1.5 0.8 - 5.3 10e3/uL    Absolute Monocytes 0.7 0.0 - 1.3 10e3/uL    Absolute Eosinophils 0.2 0.0 - 0.7 10e3/uL    Absolute Basophils 0.1 0.0 - 0.2 10e3/uL    Absolute Immature Granulocytes 0.0 <=0.4 10e3/uL    Absolute NRBCs 0.0 10e3/uL       Medications   alum & mag hydroxide-simethicone (MAALOX) suspension 15 mL (15 mLs Oral $Given 9/5/23 8994)       Assessments & Plan (with Medical Decision Making)  38-year-old woke at 330 this morning diaphoretic with epigastric pain rating up into her chest and left scapula.  Had a burning sensation but also some tightness.  Was seen a few days ago with epigastric pain.  CT showed nonspecific colitis.  Did not have chest pain that time.  EKG shows no acute ischemic changes.  Troponin was normal.  D-dimer undetectable.  Rest of her labs are unremarkable.    Pain has improved.  Last week it occurred after eating.  This time it woke her from sleep.  Right upper quadrant ultrasound ordered to further evaluate her gallbladder.  We discussed a HIDA scan for completeness sake as an outpatient if she has recurrent  issues and her gallbladder is nondiagnostic.  Liquid antiacid may be helped her discomfort a bit.  Would be reasonable to start her on a PPI until we get everything sorted out.  Dr. Guardado assumed her care at change of shift.  He will follow-up on her ultrasound results.  See his note for final diagnosis/disposition.     I have reviewed the nursing notes.    I have reviewed the findings, diagnosis, plan and need for follow up with the patient.           Medical Decision Making  The patient's presentation was of moderate complexity (an undiagnosed new problem with uncertain diagnosis).    The patient's evaluation involved:  review of external note(s) from 1 sources (see separate area of note for details)  review of 1 test result(s) ordered prior to this encounter (see separate area of note for details)  ordering and/or review of 3+ test(s) in this encounter (see separate area of note for details)    The patient's management necessitated moderate risk (prescription drug management including medications given in the ED) and further care after sign-out to Dr Guardado (see their note for further management).        New Prescriptions    No medications on file       Final diagnoses:   Chest pain, unspecified type   Upper abdominal pain       9/5/2023   [facility] EMERGENCY DEPT       Boris Ayon MD  09/05/23 0818

## 2023-09-05 NOTE — DISCHARGE INSTRUCTIONS
Return to the emergency department if you develop new or worsening symptoms.  Take the omeprazole daily.  Use the Zofran as needed for nausea if no improvement you may need to have an upper endoscopy or further evaluation.  It was a pleasure to meet you.

## 2023-09-07 NOTE — TELEPHONE ENCOUNTER
Patient is wondering if she is able to take her migraine medication (naratriptan) with the sertraline? Please advise

## 2023-09-18 PROBLEM — G43.109 MIGRAINE WITH AURA AND WITHOUT STATUS MIGRAINOSUS, NOT INTRACTABLE: Chronic | Status: ACTIVE | Noted: 2018-10-24

## 2023-09-18 PROBLEM — F41.9 ANXIETY: Chronic | Status: ACTIVE | Noted: 2018-06-20

## 2023-09-18 PROBLEM — E66.811 CLASS 1 OBESITY DUE TO EXCESS CALORIES WITH SERIOUS COMORBIDITY AND BODY MASS INDEX (BMI) OF 33.0 TO 33.9 IN ADULT: Chronic | Status: ACTIVE | Noted: 2023-09-18

## 2023-09-18 PROBLEM — E66.09 CLASS 1 OBESITY DUE TO EXCESS CALORIES WITH SERIOUS COMORBIDITY AND BODY MASS INDEX (BMI) OF 33.0 TO 33.9 IN ADULT: Chronic | Status: ACTIVE | Noted: 2023-09-18

## 2023-09-18 PROBLEM — H72.91 PERFORATION OF RIGHT TYMPANIC MEMBRANE: Status: RESOLVED | Noted: 2018-06-03 | Resolved: 2023-09-18

## 2023-09-18 PROBLEM — F43.21 ADJUSTMENT DISORDER WITH DEPRESSED MOOD: Status: RESOLVED | Noted: 2018-06-20 | Resolved: 2023-09-18

## 2023-09-19 ENCOUNTER — VIRTUAL VISIT (OUTPATIENT)
Dept: SLEEP MEDICINE | Facility: CLINIC | Age: 38
End: 2023-09-19
Attending: FAMILY MEDICINE
Payer: COMMERCIAL

## 2023-09-19 VITALS — HEIGHT: 62 IN | WEIGHT: 175 LBS | BODY MASS INDEX: 32.2 KG/M2

## 2023-09-19 DIAGNOSIS — R06.83 SNORING: Primary | ICD-10-CM

## 2023-09-19 DIAGNOSIS — E66.09 CLASS 1 OBESITY DUE TO EXCESS CALORIES WITH SERIOUS COMORBIDITY AND BODY MASS INDEX (BMI) OF 33.0 TO 33.9 IN ADULT: Chronic | ICD-10-CM

## 2023-09-19 DIAGNOSIS — R40.0 DAYTIME SOMNOLENCE: ICD-10-CM

## 2023-09-19 DIAGNOSIS — E66.811 CLASS 1 OBESITY DUE TO EXCESS CALORIES WITH SERIOUS COMORBIDITY AND BODY MASS INDEX (BMI) OF 33.0 TO 33.9 IN ADULT: Chronic | ICD-10-CM

## 2023-09-19 PROCEDURE — 99204 OFFICE O/P NEW MOD 45 MIN: CPT | Mod: VID | Performed by: INTERNAL MEDICINE

## 2023-09-19 RX ORDER — ZOLPIDEM TARTRATE 5 MG/1
TABLET ORAL
Qty: 1 TABLET | Refills: 0 | Status: SHIPPED | OUTPATIENT
Start: 2023-09-19 | End: 2023-09-28

## 2023-09-19 ASSESSMENT — SLEEP AND FATIGUE QUESTIONNAIRES
HOW LIKELY ARE YOU TO NOD OFF OR FALL ASLEEP WHILE SITTING AND TALKING TO SOMEONE: WOULD NEVER DOZE
HOW LIKELY ARE YOU TO NOD OFF OR FALL ASLEEP WHILE SITTING INACTIVE IN A PUBLIC PLACE: WOULD NEVER DOZE
HOW LIKELY ARE YOU TO NOD OFF OR FALL ASLEEP WHILE WATCHING TV: SLIGHT CHANCE OF DOZING
HOW LIKELY ARE YOU TO NOD OFF OR FALL ASLEEP WHEN YOU ARE A PASSENGER IN A CAR FOR AN HOUR WITHOUT A BREAK: SLIGHT CHANCE OF DOZING
HOW LIKELY ARE YOU TO NOD OFF OR FALL ASLEEP WHILE SITTING AND READING: SLIGHT CHANCE OF DOZING
HOW LIKELY ARE YOU TO NOD OFF OR FALL ASLEEP WHILE SITTING QUIETLY AFTER LUNCH WITHOUT ALCOHOL: WOULD NEVER DOZE
HOW LIKELY ARE YOU TO NOD OFF OR FALL ASLEEP WHILE LYING DOWN TO REST IN THE AFTERNOON WHEN CIRCUMSTANCES PERMIT: SLIGHT CHANCE OF DOZING
HOW LIKELY ARE YOU TO NOD OFF OR FALL ASLEEP IN A CAR, WHILE STOPPED FOR A FEW MINUTES IN TRAFFIC: WOULD NEVER DOZE

## 2023-09-19 ASSESSMENT — PAIN SCALES - GENERAL: PAINLEVEL: NO PAIN (0)

## 2023-09-19 NOTE — PATIENT INSTRUCTIONS
Read the book Say Good Night To Insomnia          Your BMI is Body mass index is 32.01 kg/m .    What is BMI?  Body mass index (BMI) is one way to tell whether you are at a healthy weight, overweight, or obese. It measures your weight in relation to your height.  A BMI of 18.5 to 24.9 is in the healthy range. A person with a BMI of 25 to 29.9 is considered overweight, and someone with a BMI of 30 or greater is considered obese.  Another way to find out if you are at risk for health problems caused by overweight and obesity is to measure your waist. If you are a woman and your waist is more than 35 inches, or if you are a man and your waist is more than 40 inches, your risk of disease may be higher.  More than two-thirds of American adults are considered overweight or obese. Being overweight or obese increases the risk for further weight gain.  Excess weight may lead to heart disease and diabetes. Creating and following plans for healthy eating and physical activity may help you improve your health.    Methods for maintaining or losing weight.  Weight control is part of healthy lifestyle and includes exercise, emotional health, and healthy eating habits.  Careful eating habits lifelong is the mainstay of weight control.  Though there are significant health benefits from weight loss, long-term weight loss with diet alone may be very difficult to achieve- studies show long-term success with dietary management in less than 10% of people. Attaining a healthy weight may be especially difficult to achieve in those with severe obesity. In some cases, medications, devices and surgical management might be considered.    What can you do?  If you are overweight or obese and are interested in methods for weight loss, you should discuss this with your provider. In addition, we recommend that you review healthy life styles and methods for weight loss available through the National Institutes of Health patient information sites:    http://win.niddk.nih.gov/publications/index.htm

## 2023-09-19 NOTE — PROGRESS NOTES
Outpatient Sleep Medicine Consultation:      Name: Martin Daigle MRN# [id number]   Age: 38 year old YOB: 1985     Date of Consultation: September 19, 2023  Consultation is requested by: Jerald Fenton MD  [address] [place] [address] Jerald Fenton  Primary care provider: Jerald Fenton       Reason for Sleep Consult:     Martin Daigle is sent by Jerald Fenton for a sleep consultation regarding snoring and 'excessive daytime sleepiness'.       Patient s Reason for visit  Martin Daigle main reason for visit: Was waking with headaches and having problems sleepiing!  Patient states problem(s) started: a year or so  Martin Daigle's goals for this visit: to see if i have a sleep problem or if it is just my anxiety or something           Assessment and Plan:       Socially disruptive snoring, possible mild fatigue (ESS 4), morning headaches, occasional sleep maintenance difficulties, heartburn at night, night sweats.   - Elect Polysomnogram (using 4% desaturation/Medicare/ AASM 1B scoring rules) for possible obstructive sleep apnea. Ambien if needed. Patient is a poor candidate for Home Sleep Testing due to symptoms of DORA but low pre-test probability of DORA  (STOPBANG 1).  -   If HSAT normal would recommend attended Polysomnogram. If mild obstructive sleep apnea would want CPAP. If moderate-severe would recommend CPAP     Sleep onset, maintenance difficulties (WHITNEY 10)  Suspect incipient psychophysiologic insomnia comorbid to anxiety and depression. We discussed stimulus control, sleep restriction and regular wake times.   - Read the book Say Good Night To Insomnia    - Consider referral for cognitive behavioral training      Obesity  We discussed the link between obesity, sleep apnea, and health outcomes.   - See patient instructions        Summary Counseling:    Sleep Testing Reviewed  Obstructive Sleep Apnea Reviewed  Complications of Untreated Sleep Apnea  Reviewed  Treatment options for obstructive sleep apnea reviewed       I spent 30 minutes with patient including counseling, and 10 minutes with chart review, and documentation     CC: Jerald Fenton          History of Present Illness:       SLEEP-WAKE SCHEDULE:     Work/School Days: Patient goes to school/work: Yes   Usually gets into bed at 1030 or 1100  Takes patient about half hour to fall asleep  Has trouble falling asleep 3 nights per week due to an over-active mind    Wakes up in the middle of the night 1 or 2 times.  Wakes up due to Use the bathroom;Anxiety  She has trouble falling back asleep 2 or 3 times a week due to an over-active mind or just lays in bed playing with phone if its after 0430  It usually takes hour to get back to sleep  Patient is usually up at 530  Uses alarm: Yes    Weekends/Non-work Days/All Other Days:  Usually gets into bed at 1100   Takes patient about half hour to fall asleep  Patient is usually up at 730   Uses alarm: No    Sleep Need  Patient gets  6 hours sleep on average   Patient thinks she needs about i don't konw sleep    Martin Daigle prefers to sleep in this position(s): Side;Stomach   Patient states they do the following activities in bed: Use phone, computer, or tablet     Naps  Patient takes a purposeful nap none times a week and naps are usually hour in duration  She dozes off unintentionally 1 days per week  Patient has had a driving accident or near-miss due to sleepiness/drowsiness: No      SLEEP DISRUPTIONS:    Breathing/Snoring  Patient snores:Yes  Other people complain about her snoring: Yes  Patient has been told she stops breathing in her sleep:No  She has issues with the following: Morning headaches;Morning mouth dryness;Stuffy nose when you wake up;Heartburn or reflux at night    Movement:  Patient gets pain, discomfort, with an urge to move:  Yes   Denies frequent restless leg syndrome   It happens when she is resting:  Yes  It happens more at  night:  No  Patient has been told she kicks her legs at night:  No     Behaviors in Sleep:  Martin Daigle has experienced the following behaviors while sleeping:  Rarely awakens crying    She has experienced sudden muscle weakness during the day: No      CAFFEINE AND OTHER SUBSTANCES:    Patient consumes caffeinated beverages per day:  tea sometimes, right before bed  Last caffeine use is usually: tea at bedtime  List of any prescribed or over the counter stimulants that patient takes: i am on zoloft  List of any prescribed or over the counter sleep medication patient takes: none  List of previous sleep medications that patient has tried: nothing, just tea or lavendar oil  Patient drinks alcohol to help them sleep: No  Patient drinks alcohol near bedtime: No    Family History:  Patient has a family member been diagnosed with a sleep disorder: No            SCALES:    EPWORTH SLEEPINESS SCALE         9/19/2023     6:11 AM    Denton Sleepiness Scale ( EVAN Zhou  [id number]<br>ESS - [place]/English - Final version - 21 Nov 07 - [facility] [place].)   Sitting and reading Slight chance of dozing   Watching TV Slight chance of dozing   Sitting, inactive in a public place (e.g. a theatre or a meeting) Would never doze   As a passenger in a car for an hour without a break Slight chance of dozing   Lying down to rest in the afternoon when circumstances permit Slight chance of dozing   Sitting and talking to someone Would never doze   Sitting quietly after a lunch without alcohol Would never doze   In a car, while stopped for a few minutes in traffic Would never doze   Denton Score (MC) 4   Denton Score (Sleep) 4         INSOMNIA SEVERITY INDEX (WHITNEY)          9/19/2023     6:03 AM   Insomnia Severity Index (WHITNEY)   Difficulty falling asleep 2   Difficulty staying asleep 1   Problems waking up too early 2   How SATISFIED/DISSATISFIED are you with your CURRENT sleep pattern? 2   How NOTICEABLE to others do you think your  sleep problem is in terms of impairing the quality of your life? 1   How WORRIED/DISTRESSED are you about your current sleep problem? 1   To what extent do you consider your sleep problem to INTERFERE with your daily functioning (e.g. daytime fatigue, mood, ability to function at work/daily chores, concentration, memory, mood, etc.) CURRENTLY? 1   WHITNEY Total Score 10       Guidelines for Scoring/Interpretation:  Total score categories:  0-7 = No clinically significant insomnia   8-14 = Subthreshold insomnia   15-21 = Clinical insomnia (moderate severity)  22-28 = Clinical insomnia (severe)  Used via courtesy of www.[place].gov with permission from Nash Cavazos PhD., [facility]      Allergies:    Allergies   Allergen Reactions    Penicillins        Medications:    Current Outpatient Medications   Medication Sig Dispense Refill    LORazepam (ATIVAN) 1 MG tablet Take 0.5 tablets (0.5 mg) by mouth every 6 hours as needed for anxiety 5 tablet 0    naratriptan (AMERGE) 2.5 MG tablet Take 1 tab by mouth at the onset of headache/migraine, may repeat dose in 4 hours if needed, Max 2 tab/24 hours. 9 tablet 1    omeprazole (PRILOSEC) 40 MG DR capsule Take 1 capsule (40 mg) by mouth daily 14 capsule 0    sertraline (ZOLOFT) 50 MG tablet Take 1 tablet (50 mg) by mouth daily 30 tablet 1       Problem List:  Patient Active Problem List    Diagnosis Date Noted    Migraine with aura and without status migrainosus, not intractable 10/24/2018     Priority: Medium    Anxiety, unspecified 06/20/2018     Priority: Medium    Class 1 obesity due to excess calories with serious comorbidity and body mass index (BMI) of 33.0 to 33.9 in adult 09/18/2023     Priority: Low    CARDIOVASCULAR SCREENING; LDL GOAL LESS THAN 160 12/27/2011     Priority: Low        Past Medical/Surgical History:  Past Medical History:   Diagnosis Date    ABNORM ELECTROCARDIOGRAM 11/08/2001    Adjustment disorder with depressed mood 06/20/2018    Anxiety   "   Hx maternal GBS (group B streptococcus) affected , pregnant 2016    Perforation of right tympanic membrane 2018    Syncope and collapse 2001    Tinea versicolor 2010     Past Surgical History:   Procedure Laterality Date    ENT SURGERY      P.E. Tubes x 2    ORTHOPEDIC SURGERY      release trigger finger right hand       Social History:  Social History     Socioeconomic History    Marital status:      Spouse name: Nomi    Number of children: 3    Years of education: 17    Highest education level: Not on file   Occupational History    Occupation:    Tobacco Use    Smoking status: Never    Smokeless tobacco: Never   Vaping Use    Vaping Use: Never used   Substance and Sexual Activity    Alcohol use: Yes     Alcohol/week: 0.0 standard drinks of alcohol     Comment: \"once in a while\"    Drug use: No    Sexual activity: Yes     Partners: Male     Birth control/protection: OCP, Condom   Other Topics Concern    Parent/sibling w/ CABG, MI or angioplasty before 65F 55M? No     Service No    Blood Transfusions No    Caffeine Concern Yes     Comment: 2-3 times per month will have a soda    Occupational Exposure No     Comment: [employer]    Hobby Hazards No    Sleep Concern Yes     Comment: Wakes a lot at night.     Stress Concern Yes     Comment: Unplanned pregnancy, but is excited.    Weight Concern No    Special Diet No    Back Care No    Exercise Yes     Comment: Advised 30 minutes/day - discussed types as walking/swimming/low-impact    Bike Helmet Yes    Seat Belt Yes    Self-Exams No   Social History Narrative     and lives in [place] with , Nomi and their sons, Wilfredo and Maksim.  They live with her parents at this time.   No smokers in the home.  No concerns about domestic violence.  Has indoor cats/advised about toxoplasmosis precautions.       Social Determinants of Health     Financial Resource Strain: Not on file   Food Insecurity: Not on file " "  Transportation Needs: Not on file   Physical Activity: Not on file   Stress: Not on file   Social Connections: Not on file   Intimate Partner Violence: Not on file   Housing Stability: Not on file       Family History:  Family History   Problem Relation Age of Onset    Hypertension Father     Diabetes Father         Diet controlled? Not insulin dependent     Osteoporosis Maternal Grandmother     Heart Disease Maternal Grandfather         cardiac sugery    Diabetes Paternal Grandfather         Insulin Dependent    Cerebrovascular Disease Paternal Grandfather     Allergies Sister        Review of Systems:  A complete review of systems reviewed by me is negative with the exeption of what has been mentioned in the history of present illness.  In the last TWO WEEKS have you experienced any of the following symptoms?  Fevers: No  Night Sweats: Yes  Weight Gain: No  Pain at Night: No  Double Vision: No  Changes in Vision: Yes  Difficulty Breathing through Nose: No  Sore Throat in Morning: Yes  Dry Mouth in the Morning: Yes  Shortness of Breath Lying Flat: Yes  Shortness of Breath With Activity: Yes  Awakening with Shortness of Breath: Yes  Increased Cough: No  Heart Racing at Night: Yes  Swelling in Feet or Legs: No  Diarrhea at Night: No  Heartburn at Night: Yes  Urinating More than Once at Night: No  Losing Control of Urine at Night: No  Joint Pains at Night: No  Headaches in Morning: Yes  Weakness in Arms or Legs: Yes  Depressed Mood: Yes  Anxiety: Yes     Physical Examination:  Vitals: Ht 1.575 m (5' 2\")   Wt 79.4 kg (175 lb)   LMP 08/15/2023 (Approximate)   BMI 32.01 kg/m    BMI= Body mass index is 32.01 kg/m .      SpO2 Readings from Last 4 Encounters:   09/05/23 98%   09/01/23 100%   02/28/23 97%   07/16/21 98%       GENERAL APPEARANCE: alert and no distress  EYES: Eyes grossly normal to inspection  LUNGS: no shortness of breath , cough  NEURO: mentation intact, speech normal and cranial nerves 2-12 appear " intact  PSYCH: affect normal/bright             Data: All pertinent previous laboratory data reviewed     Recent Labs   Lab Test 09/05/23  0644 09/01/23  1230    140   POTASSIUM 3.7 4.2   CHLORIDE 103 103   CO2 24 24   ANIONGAP 12 13   * 98   BUN 10.0 10.2   CR 0.90 0.89   FUNMI 8.8 9.6       Recent Labs   Lab Test 09/05/23  0644   WBC 9.0   RBC 4.64   HGB 13.0   HCT 38.7   MCV 83   MCH 28.0   MCHC 33.6   RDW 13.0          Recent Labs   Lab Test 09/05/23  0644   PROTTOTAL 6.7   ALBUMIN 4.1   BILITOTAL 0.4   ALKPHOS 72   AST 19   ALT 18       TSH   Date Value   02/28/2023 2.06 uIU/mL   04/09/2018 2.74 mU/L   11/06/2001 1.57 mcU/mL       Len Ortiz MD 9/19/2023

## 2023-09-19 NOTE — NURSING NOTE
Is the patient currently in the state of [place]? YES    Visit mode:VIDEO    If the visit is dropped, the patient can be reconnected by: VIDEO VISIT: Text to cell phone:   Telephone Information:   Mobile [phone]       Will anyone else be joining the visit? NO  (If patient encounters technical issues they should call [phone])    How would you like to obtain your AVS? MyChart    Are changes needed to the allergy or medication list? No    Reason for visit: Consult    Aqu[employer] ASENCIO

## 2023-09-19 NOTE — PROGRESS NOTES
Virtual Visit Details    Type of service:  Video Visit     Originating Location (pt. Location): Home    Distant Location (provider location):  Off-site  Platform used for Video Visit: Trae

## 2023-09-28 ENCOUNTER — VIRTUAL VISIT (OUTPATIENT)
Dept: FAMILY MEDICINE | Facility: CLINIC | Age: 38
End: 2023-09-28
Payer: COMMERCIAL

## 2023-09-28 DIAGNOSIS — F41.9 ANXIETY: ICD-10-CM

## 2023-09-28 PROCEDURE — 99214 OFFICE O/P EST MOD 30 MIN: CPT | Mod: GT | Performed by: FAMILY MEDICINE

## 2023-09-28 RX ORDER — SERTRALINE HYDROCHLORIDE 100 MG/1
100 TABLET, FILM COATED ORAL DAILY
Qty: 30 TABLET | Refills: 2 | Status: SHIPPED | OUTPATIENT
Start: 2023-09-28 | End: 2023-12-19

## 2023-09-28 ASSESSMENT — ANXIETY QUESTIONNAIRES
GAD7 TOTAL SCORE: 9
GAD7 TOTAL SCORE: 9
6. BECOMING EASILY ANNOYED OR IRRITABLE: SEVERAL DAYS
3. WORRYING TOO MUCH ABOUT DIFFERENT THINGS: SEVERAL DAYS
7. FEELING AFRAID AS IF SOMETHING AWFUL MIGHT HAPPEN: SEVERAL DAYS
4. TROUBLE RELAXING: SEVERAL DAYS
1. FEELING NERVOUS, ANXIOUS, OR ON EDGE: MORE THAN HALF THE DAYS
5. BEING SO RESTLESS THAT IT IS HARD TO SIT STILL: SEVERAL DAYS
IF YOU CHECKED OFF ANY PROBLEMS ON THIS QUESTIONNAIRE, HOW DIFFICULT HAVE THESE PROBLEMS MADE IT FOR YOU TO DO YOUR WORK, TAKE CARE OF THINGS AT HOME, OR GET ALONG WITH OTHER PEOPLE: SOMEWHAT DIFFICULT
2. NOT BEING ABLE TO STOP OR CONTROL WORRYING: MORE THAN HALF THE DAYS

## 2023-09-28 NOTE — COMMUNITY RESOURCES LIST (ENGLISH)
09/28/2023   [facility]  N/A  For questions about this resource list or additional care needs, please contact your primary care clinic or care manager.  Phone: [phone]   Email: N/A   Address: [address] [place] [id number]   Hours: N/A        Food and Nutrition       Food pantry  1  [facility] Distance: 8.38 miles      Pickup   [address] [place] [address]  Language: English  Hours: Thu 12:00 PM - 4:00 PM  Fees: Free   Phone: (748) 424-9936 Email: [email or web address] Website: [email or web address]/     2  [facility] Distance: 14.46 miles      Pickup   [address] [place] [address]  Language: English  Hours: Mon - Fri 8:00 AM - 4:30 PM  Fees: Free   Phone: (169) 638-8443 Email: Tonny@[facility].[employer]nsn.gov Website: https://EqualEyes/services/community-support-services#EmergencyServices     SNAP application assistance  3  [facility] [place] [facility] [employer] Distance: 21.59 miles      In-Person, Pickup, Phone/Virtual   [address] [place] [address]  Language: English  Hours: Mon - Fri 8:00 AM - 5:00 PM  Fees: Free   Phone: (613) 797-7935 Website: https://EqualEyes/government/departmentspublic/more-about-aanji     4  [facility] - Main Office Distance: 21.85 miles      In-Person, Phone/Virtual   [address] [place] [address]  Language: English  Hours: Mon - Fri 6:00 AM - 6:30 PM  Fees: Free   Phone: (326) 398-8896 Email: yuliya@[employer] Website: http://www.[employer]          Important Numbers & Websites       Emergency Services   911  City Services   311  Poison Control   (788) 982-7247  Suicide Prevention Lifeline   (135) 283-3311 (TALK)  Child Abuse Hotline   (795) 570-1468 (4-A-Child)  Sexual Assault Hotline   (164) 319-8539 (HOPE)  National Runaway Safeline   (343) 701-2279 (RUNAWAY)  All-Options Talkline    (254) 564-2490  Substance Abuse Referral   (705) 744-9646 (HELP)

## 2023-09-28 NOTE — PATIENT INSTRUCTIONS
"Look into mindfulness training apps for your phone.  Calm and Headspace are 2 of the most common ones.  Insight Timer is a free option (with some paid upgrade options).    Tc Hannon MD:  Stress Remedy - Relaxation on the Run:  https://stressremedy.com/     \"Feeling Good\" by Dr. Armando Sharma MD   "

## 2023-09-28 NOTE — PROGRESS NOTES
"Martin is a 38 year old who is being evaluated via a billable video visit.      How would you like to obtain your AVS? MyChart  If the video visit is dropped, the invitation should be resent by: Text to cell phone: [phone]  Will anyone else be joining your video visit? No          Assessment & Plan     ASSESSMENT/ORDERS:    ICD-10-CM    1. Anxiety  F41.9 sertraline (ZOLOFT) 100 MG tablet        PLAN:  1.  Increased dose to 100 mg/day, can take 50 mg pills to slightly increase to 75 mg if side effects worsen again.  Follow-up within 3 months for recheck.              BMI:   Estimated body mass index is 32.01 kg/m  as calculated from the following:    Height as of 9/19/23: 1.575 m (5' 2\").    Weight as of 9/19/23: 79.4 kg (175 lb).           Jerald Fenton MD  [facility]   Martin is a 38 year old, presenting for the following health issues:  No chief complaint on file.        9/28/2023    11:02 AM   Additional Questions   Roomed by jensen   Accompanied by self       History of Present Illness       Reason for visit:  Follow up for a medicine I just started a month ago!    She eats 2-3 servings of fruits and vegetables daily.She consumes 1 sweetened beverage(s) daily.She exercises with enough effort to increase her heart rate 10 to 19 minutes per day.  She exercises with enough effort to increase her heart rate 3 or less days per week.   She is taking medications regularly.       Anxiety Follow-Up  How are you doing with your anxiety since your last visit? Pt says it goes up and down but better since starting medication  Are you having other symptoms that might be associated with anxiety? Yes:  sleeping  Have you had a significant life event? No   Are you feeling depressed? No  Do you have any concerns with your use of alcohol or other drugs? No    Social History     Tobacco Use    Smoking status: Never    Smokeless tobacco: Never   Vaping Use    Vaping Use: Never used " "  Substance Use Topics    Alcohol use: Yes     Comment: \"once in a while\", 1/month    Drug use: No         6/15/2018     1:36 PM 10/23/2018     2:18 PM 2/27/2023     5:04 PM   KANE-7 SCORE   Total Score   17 (severe anxiety)   Total Score 14 14 17         12/2/2018     8:36 PM 1/3/2020     4:14 PM 2/27/2023     5:04 PM   PHQ   PHQ-9 Total Score 14 7 7   Q9: Thoughts of better off dead/self-harm past 2 weeks Not at all Not at all Not at all         9/28/2023    11:00 AM   KANE-7    1. Feeling nervous, anxious, or on edge 2   2. Not being able to stop or control worrying 2   3. Worrying too much about different things 1   4. Trouble relaxing 1   5. Being so restless that it is hard to sit still 1   6. Becoming easily annoyed or irritable 1   7. Feeling afraid, as if something awful might happen 1   KANE-7 Total Score 9   If you checked any problems, how difficult have they made it for you to do your work, take care of things at home, or get along with other people? Somewhat difficult       Some initial typical SSRI side effects, but doing better now.  Still some panic episodes with blood pressure elevation when they happen but much less than piror to starting medication a month ago.  Feels it is working but still improvement possible.    Review of Systems         Objective           Vitals:  No vitals were obtained today due to virtual visit.    Physical Exam   GENERAL: Healthy, alert and no distress  EYES: Eyes grossly normal to inspection.  No discharge or erythema, or obvious scleral/conjunctival abnormalities.  RESP: No audible wheeze, cough, or visible cyanosis.  No visible retractions or increased work of breathing.    SKIN: Visible skin clear. No significant rash, abnormal pigmentation or lesions.  NEURO: Cranial nerves grossly intact.  Mentation and speech appropriate for age.  PSYCH: Mentation appears normal, affect normal/bright, judgement and insight intact, normal speech and appearance well-groomed.           "      Video-Visit Details    Type of service:  Video Visit   Start of visit:  12:27 PM  End of visit:  12:38 PM    Originating Location (pt. Location): Home    Distant Location (provider location):  On-site  Platform used for Video Visit: Trae

## 2023-12-19 ENCOUNTER — MYC MEDICAL ADVICE (OUTPATIENT)
Dept: FAMILY MEDICINE | Facility: CLINIC | Age: 38
End: 2023-12-19
Payer: COMMERCIAL

## 2023-12-19 DIAGNOSIS — F41.9 ANXIETY: ICD-10-CM

## 2023-12-19 RX ORDER — SERTRALINE HYDROCHLORIDE 100 MG/1
100 TABLET, FILM COATED ORAL DAILY
Qty: 90 TABLET | Refills: 1 | Status: SHIPPED | OUTPATIENT
Start: 2023-12-19 | End: 2024-03-11

## 2023-12-19 NOTE — TELEPHONE ENCOUNTER
Scheduled patient for a medication follow up, nothing available on your schedule till march. Is this ok to wait till then or should she be seen sooner?

## 2024-03-11 ENCOUNTER — VIRTUAL VISIT (OUTPATIENT)
Dept: FAMILY MEDICINE | Facility: CLINIC | Age: 39
End: 2024-03-11
Payer: COMMERCIAL

## 2024-03-11 DIAGNOSIS — F41.9 ANXIETY: ICD-10-CM

## 2024-03-11 PROCEDURE — 99213 OFFICE O/P EST LOW 20 MIN: CPT | Mod: GT | Performed by: FAMILY MEDICINE

## 2024-03-11 RX ORDER — SERTRALINE HYDROCHLORIDE 100 MG/1
100 TABLET, FILM COATED ORAL DAILY
Qty: 90 TABLET | Refills: 2 | Status: SHIPPED | OUTPATIENT
Start: 2024-03-11

## 2024-03-11 ASSESSMENT — ENCOUNTER SYMPTOMS: NERVOUS/ANXIOUS: 1

## 2024-03-11 ASSESSMENT — ANXIETY QUESTIONNAIRES
8. IF YOU CHECKED OFF ANY PROBLEMS, HOW DIFFICULT HAVE THESE MADE IT FOR YOU TO DO YOUR WORK, TAKE CARE OF THINGS AT HOME, OR GET ALONG WITH OTHER PEOPLE?: SOMEWHAT DIFFICULT
GAD7 TOTAL SCORE: 5
7. FEELING AFRAID AS IF SOMETHING AWFUL MIGHT HAPPEN: NOT AT ALL
3. WORRYING TOO MUCH ABOUT DIFFERENT THINGS: NOT AT ALL
6. BECOMING EASILY ANNOYED OR IRRITABLE: SEVERAL DAYS
7. FEELING AFRAID AS IF SOMETHING AWFUL MIGHT HAPPEN: NOT AT ALL
IF YOU CHECKED OFF ANY PROBLEMS ON THIS QUESTIONNAIRE, HOW DIFFICULT HAVE THESE PROBLEMS MADE IT FOR YOU TO DO YOUR WORK, TAKE CARE OF THINGS AT HOME, OR GET ALONG WITH OTHER PEOPLE: SOMEWHAT DIFFICULT
4. TROUBLE RELAXING: SEVERAL DAYS
1. FEELING NERVOUS, ANXIOUS, OR ON EDGE: SEVERAL DAYS
2. NOT BEING ABLE TO STOP OR CONTROL WORRYING: SEVERAL DAYS
GAD7 TOTAL SCORE: 5
5. BEING SO RESTLESS THAT IT IS HARD TO SIT STILL: SEVERAL DAYS

## 2024-03-11 NOTE — PROGRESS NOTES
"    Instructions Relayed to Patient by Virtual Roomer:     Patient is active on [employer]:   Relayed following to patient: \"It looks like you are active on [employer], are you able to join the visit this way? If not, do you need us to send you a link now or would you like your provider to send a link via text or email when they are ready to initiate the visit?\"    Reminded patient to ensure they were logged on to virtual visit by arrival time listed. Documented in appointment notes if patient had flexibility to initiate visit sooner than arrival time. If pediatric virtual visit, ensured pediatric patient along with parent/guardian will be present for video visit.     Patient offered the website [email or web address] and/or phone number to [employer] Help line: [phone]    Martin is a 39 year old who is being evaluated via a billable video visit.      How would you like to obtain your AVS? [employer]  If the video visit is dropped, the invitation should be resent by: Text to cell phone: [phone]  Will anyone else be joining your video visit? No          Assessment & Plan     ASSESSMENT/ORDERS:    ICD-10-CM    1. Anxiety  F41.9 sertraline (ZOLOFT) 100 MG tablet        PLAN:    Medications refilled for all other above noted stable conditions.       Follow-up Visit   Expected date:  Sep 11, 2024 (Approximate)      Follow Up Appointment Details:     Follow-up with whom?: Me    Follow-Up for what?: Adult Preventive    Any Additional Chronic Condition Management?: Anxiety    How?: In Person                              BMI  Estimated body mass index is 32.01 kg/m  as calculated from the following:    Height as of 9/19/23: 1.575 m (5' 2\").    Weight as of 9/19/23: 79.4 kg (175 lb).             Subjective   Martin is a 39 year old, presenting for the following health issues:  Anxiety      3/11/2024    10:21 AM   Additional Questions   Roomed by jensen   Accompanied by self     Anxiety    History of Present Illness "       Mental Health Follow-up:  Patient presents to follow-up on Anxiety.    Patient's anxiety since last visit has been:  Good  The patient is not having other symptoms associated with anxiety.  Any significant life events: No  Patient is feeling anxious or having panic attacks.  Patient has no concerns about alcohol or drug use.          2/27/2023     5:04 PM 9/28/2023    11:00 AM 3/11/2024    10:20 AM   KANE-7 SCORE   Total Score 17 (severe anxiety) 9 (mild anxiety) 5 (mild anxiety)   Total Score 17 9 5                Patient states she is doing well on current sertraline 100 mg daily dose.  She is also seeing a therapist which has really helped with her anxiety management.  She still gets occasional flareup of anxiety, but it is relatively easy to manage.  She notes that her relationship with her family and her  is better also with therapy.  She does not endorse any depression symptoms.        Objective           Vitals:  No vitals were obtained today due to virtual visit.    Physical Exam   GENERAL: alert and no distress  EYES: Eyes grossly normal to inspection.  No discharge or erythema, or obvious scleral/conjunctival abnormalities.  RESP: No audible wheeze, cough, or visible cyanosis.    SKIN: Visible skin clear. No significant rash, abnormal pigmentation or lesions.  NEURO: Cranial nerves grossly intact.  Mentation and speech appropriate for age.  PSYCH: Appropriate affect, tone, and pace of words          Video-Visit Details    Type of service:  Video Visit   Start of visit:  12:19 PM  End of visit:  12:29 PM    Originating Location (pt. Location): Home    Distant Location (provider location):  On-site  Platform used for Video Visit: Trae  Signed Electronically by: Jerald Fenton MD

## 2024-08-03 ENCOUNTER — HEALTH MAINTENANCE LETTER (OUTPATIENT)
Age: 39
End: 2024-08-03

## 2024-10-29 ENCOUNTER — MYC REFILL (OUTPATIENT)
Dept: FAMILY MEDICINE | Facility: CLINIC | Age: 39
End: 2024-10-29
Payer: COMMERCIAL

## 2024-10-29 DIAGNOSIS — G43.109 MIGRAINE WITH AURA AND WITHOUT STATUS MIGRAINOSUS, NOT INTRACTABLE: ICD-10-CM

## 2024-10-30 RX ORDER — NARATRIPTAN 2.5 MG/1
TABLET ORAL
Qty: 9 TABLET | Refills: 1 | Status: SHIPPED | OUTPATIENT
Start: 2024-10-30

## 2024-11-07 ENCOUNTER — TELEPHONE (OUTPATIENT)
Dept: FAMILY MEDICINE | Facility: CLINIC | Age: 39
End: 2024-11-07

## 2024-11-07 NOTE — TELEPHONE ENCOUNTER
Patient Quality Outreach    Patient is due for the following:   Cervical Cancer Screening - PAP Needed    Next Steps:   Patient has upcoming appointment, these items will be addressed at that time.    Type of outreach:    Chart review performed, no outreach needed.      Questions for provider review:    None           Jesusita Roth MA

## 2024-11-13 ENCOUNTER — TELEPHONE (OUTPATIENT)
Dept: FAMILY MEDICINE | Facility: CLINIC | Age: 39
End: 2024-11-13

## 2024-11-13 NOTE — TELEPHONE ENCOUNTER
Patient Quality Outreach    Patient is due for the following:   Cervical Cancer Screening - PAP Needed  Physical Preventive Adult Physical    Action(s) Taken:   Schedule a Adult Preventative    Type of outreach:    Sent [employer] message.    Questions for provider review:    None           Jesusita Roth MA

## 2024-12-17 NOTE — TELEPHONE ENCOUNTER
Patient Quality Outreach    Patient is due for the following:   Cervical Cancer Screening - PAP Needed    Action(s) Taken:   Patient has upcoming appointment, these items will be addressed at that time.    Type of outreach:    Chart review performed, no outreach needed.    Questions for provider review:    None           Jesusita Roth MA

## 2024-12-17 NOTE — TELEPHONE ENCOUNTER
Patient Quality Outreach    Patient is due for the following:   Physical Annual Wellness Visit    Action(s) Taken:   Patient has upcoming appointment, these items will be addressed at that time.    Type of outreach:    Chart review performed, no outreach needed.    Questions for provider review:    None           Jesusita Roth MA

## 2024-12-18 ENCOUNTER — MYC MEDICAL ADVICE (OUTPATIENT)
Dept: FAMILY MEDICINE | Facility: CLINIC | Age: 39
End: 2024-12-18
Payer: COMMERCIAL

## 2024-12-18 NOTE — TELEPHONE ENCOUNTER
RN TRIAGE CALL:    Patient Contact    Attempt # 1    Was call answered?  No.  Left message on voicemail with information to call me back. EpicPledge sent as well    Augusta Sousa RN, BSN

## 2024-12-18 NOTE — LETTER
[address] [address]  Phone: [phone]  Fax: [phone]        December 18, 2024      Martin Daigle                                                                                                                                [id number] [address] [place] [id number]            Dear Ms. Daigle,    We are concerned about your health care.  We recently provided you with a medication refill.  Many medications require routine follow-up with your Doctor.      At this time we ask that: {APPT REQ:461134}    Your prescription: {PRESCRIPTION:851846}      Thank you,      {:[id number]}/ ***

## 2024-12-20 NOTE — TELEPHONE ENCOUNTER
RN Triage    Patient Contact    Attempt # 2    Was call answered?  No.  Left message on voicemail with information to call me back. and sent MCM with providers recommendation.     Kristine Louie RN on 12/20/2024 at 1:48 PM

## 2024-12-20 NOTE — TELEPHONE ENCOUNTER
Please try contacting patient again.  She likely needs a visit to see what is going on regardless, so please schedule appointment with her if appropriate to do so (not needing higher level of care).    Jerald Fenton MD

## 2024-12-23 NOTE — TELEPHONE ENCOUNTER
RN Triage    Patient Contact    Attempt # 3    Was call answered?  No.  Left message on voicemail with information to call me back.    Hamilton White RN on 12/23/2024 at 9:55 AM

## 2025-01-21 ENCOUNTER — OFFICE VISIT (OUTPATIENT)
Dept: FAMILY MEDICINE | Facility: CLINIC | Age: 40
End: 2025-01-21
Payer: COMMERCIAL

## 2025-01-21 VITALS
DIASTOLIC BLOOD PRESSURE: 72 MMHG | RESPIRATION RATE: 18 BRPM | SYSTOLIC BLOOD PRESSURE: 117 MMHG | OXYGEN SATURATION: 100 % | HEART RATE: 82 BPM | TEMPERATURE: 97.5 F | WEIGHT: 187.25 LBS | BODY MASS INDEX: 31.97 KG/M2 | HEIGHT: 64 IN

## 2025-01-21 DIAGNOSIS — Z12.4 CERVICAL CANCER SCREENING: ICD-10-CM

## 2025-01-21 DIAGNOSIS — H91.91 HEARING LOSS OF RIGHT EAR, UNSPECIFIED HEARING LOSS TYPE: ICD-10-CM

## 2025-01-21 DIAGNOSIS — F41.9 ANXIETY: ICD-10-CM

## 2025-01-21 DIAGNOSIS — H65.91 RIGHT OTITIS MEDIA WITH EFFUSION: ICD-10-CM

## 2025-01-21 DIAGNOSIS — G43.109 MIGRAINE WITH AURA AND WITHOUT STATUS MIGRAINOSUS, NOT INTRACTABLE: ICD-10-CM

## 2025-01-21 DIAGNOSIS — K21.9 GASTROESOPHAGEAL REFLUX DISEASE WITHOUT ESOPHAGITIS: ICD-10-CM

## 2025-01-21 DIAGNOSIS — Z88.0 HISTORY OF PENICILLIN ALLERGY: ICD-10-CM

## 2025-01-21 DIAGNOSIS — Z00.00 ROUTINE GENERAL MEDICAL EXAMINATION AT A HEALTH CARE FACILITY: Primary | ICD-10-CM

## 2025-01-21 PROBLEM — E66.09 CLASS 1 OBESITY DUE TO EXCESS CALORIES WITH SERIOUS COMORBIDITY AND BODY MASS INDEX (BMI) OF 32.0 TO 32.9 IN ADULT: Status: ACTIVE | Noted: 2023-09-18

## 2025-01-21 PROBLEM — E66.811 CLASS 1 OBESITY DUE TO EXCESS CALORIES WITH SERIOUS COMORBIDITY AND BODY MASS INDEX (BMI) OF 32.0 TO 32.9 IN ADULT: Status: ACTIVE | Noted: 2023-09-18

## 2025-01-21 PROCEDURE — 87624 HPV HI-RISK TYP POOLED RSLT: CPT | Performed by: FAMILY MEDICINE

## 2025-01-21 PROCEDURE — G0145 SCR C/V CYTO,THINLAYER,RESCR: HCPCS | Performed by: FAMILY MEDICINE

## 2025-01-21 PROCEDURE — 99214 OFFICE O/P EST MOD 30 MIN: CPT | Mod: 25 | Performed by: FAMILY MEDICINE

## 2025-01-21 PROCEDURE — 99395 PREV VISIT EST AGE 18-39: CPT | Performed by: FAMILY MEDICINE

## 2025-01-21 PROCEDURE — G2211 COMPLEX E/M VISIT ADD ON: HCPCS | Performed by: FAMILY MEDICINE

## 2025-01-21 RX ORDER — CEFDINIR 300 MG/1
300 CAPSULE ORAL 2 TIMES DAILY
Qty: 20 CAPSULE | Refills: 0 | Status: SHIPPED | OUTPATIENT
Start: 2025-01-21 | End: 2025-01-31

## 2025-01-21 RX ORDER — SERTRALINE HYDROCHLORIDE 100 MG/1
100 TABLET, FILM COATED ORAL DAILY
Qty: 90 TABLET | Refills: 4 | Status: SHIPPED | OUTPATIENT
Start: 2025-01-21

## 2025-01-21 RX ORDER — NARATRIPTAN 2.5 MG/1
TABLET ORAL
Qty: 9 TABLET | Refills: 11 | Status: SHIPPED | OUTPATIENT
Start: 2025-01-21

## 2025-01-21 SDOH — HEALTH STABILITY: PHYSICAL HEALTH: ON AVERAGE, HOW MANY MINUTES DO YOU ENGAGE IN EXERCISE AT THIS LEVEL?: 30 MIN

## 2025-01-21 SDOH — HEALTH STABILITY: PHYSICAL HEALTH: ON AVERAGE, HOW MANY DAYS PER WEEK DO YOU ENGAGE IN MODERATE TO STRENUOUS EXERCISE (LIKE A BRISK WALK)?: 3 DAYS

## 2025-01-21 ASSESSMENT — SOCIAL DETERMINANTS OF HEALTH (SDOH): HOW OFTEN DO YOU GET TOGETHER WITH FRIENDS OR RELATIVES?: TWICE A WEEK

## 2025-01-21 ASSESSMENT — PAIN SCALES - GENERAL: PAINLEVEL_OUTOF10: NO PAIN (0)

## 2025-01-21 NOTE — PATIENT INSTRUCTIONS
Patient Education   Preventive Care Advice   This is general advice given by our system to help you stay healthy. However, your care team may have specific advice just for you. Please talk to your care team about your preventive care needs.  Nutrition  Eat 5 or more servings of fruits and vegetables each day.  Try wheat bread, brown rice and whole grain pasta (instead of white bread, rice, and pasta).  Get enough calcium and vitamin D. Check the label on foods and aim for 100% of the RDA (recommended daily allowance).  Lifestyle  Exercise at least 150 minutes each week  (30 minutes a day, 5 days a week).  Do muscle strengthening activities 2 days a week. These help control your weight and prevent disease.  No smoking.  Wear sunscreen to prevent skin cancer.  Have a dental exam and cleaning every 6 months.  Yearly exams  See your health care team every year to talk about:  Any changes in your health.  Any medicines your care team has prescribed.  Preventive care, family planning, and ways to prevent chronic diseases.  Shots (vaccines)   HPV shots (up to age 26), if you've never had them before.  Hepatitis B shots (up to age 59), if you've never had them before.  COVID-19 shot: Get this shot when it's due.  Flu shot: Get a flu shot every year.  Tetanus shot: Get a tetanus shot every 10 years.  Pneumococcal, hepatitis A, and RSV shots: Ask your care team if you need these based on your risk.  Shingles shot (for age 50 and up)  General health tests  Diabetes screening:  Starting at age 35, Get screened for diabetes at least every 3 years.  If you are younger than age 35, ask your care team if you should be screened for diabetes.  Cholesterol test: At age 39, start having a cholesterol test every 5 years, or more often if advised.  Bone density scan (DEXA): At age 50, ask your care team if you should have this scan for osteoporosis (brittle bones).  Hepatitis C: Get tested at least once in your life.  STIs (sexually  transmitted infections)  Before age 24: Ask your care team if you should be screened for STIs.  After age 24: Get screened for STIs if you're at risk. You are at risk for STIs (including HIV) if:  You are sexually active with more than one person.  You don't use condoms every time.  You or a partner was diagnosed with a sexually transmitted infection.  If you are at risk for HIV, ask about PrEP medicine to prevent HIV.  Get tested for HIV at least once in your life, whether you are at risk for HIV or not.  Cancer screening tests  Cervical cancer screening: If you have a cervix, begin getting regular cervical cancer screening tests starting at age 21.  Breast cancer scan (mammogram): If you've ever had breasts, begin having regular mammograms starting at age 40. This is a scan to check for breast cancer.  Colon cancer screening: It is important to start screening for colon cancer at age 45.  Have a colonoscopy test every 10 years (or more often if you're at risk) Or, ask your provider about stool tests like a FIT test every year or Cologuard test every 3 years.  To learn more about your testing options, visit:   .  For help making a decision, visit:   https://bit.ly/ij44078.  Prostate cancer screening test: If you have a prostate, ask your care team if a prostate cancer screening test (PSA) at age 55 is right for you.  Lung cancer screening: If you are a current or former smoker ages 50 to 80, ask your care team if ongoing lung cancer screenings are right for you.  For informational purposes only. Not to replace the advice of your health care provider. Copyright   2023 [facility]. All rights reserved. Clinically reviewed by the [facility]itions [facility]. [employer] 801115 - REV 01/24.  Learning About Stress  What is stress?     Stress is your body's response to a hard situation. Your body can have a physical, emotional, or mental response. Stress is a fact of life for most people, and it  affects everyone differently. What causes stress for you may not be stressful for someone else.  A lot of things can cause stress. You may feel stress when you go on a job interview, take a test, or run a race. This kind of short-term stress is normal and even useful. It can help you if you need to work hard or react quickly. For example, stress can help you finish an important job on time.  Long-term stress is caused by ongoing stressful situations or events. Examples of long-term stress include long-term health problems, ongoing problems at work, or conflicts in your family. Long-term stress can harm your health.  How does stress affect your health?  When you are stressed, your body responds as though you are in danger. It makes hormones that speed up your heart, make you breathe faster, and give you a burst of energy. This is called the fight-or-flight stress response. If the stress is over quickly, your body goes back to normal and no harm is done.  But if stress happens too often or lasts too long, it can have bad effects. Long-term stress can make you more likely to get sick, and it can make symptoms of some diseases worse. If you tense up when you are stressed, you may develop neck, shoulder, or low back pain. Stress is linked to high blood pressure and heart disease.  Stress also harms your emotional health. It can make you woodall, tense, or depressed. Your relationships may suffer, and you may not do well at work or school.  What can you do to manage stress?  You can try these things to help manage stress:   Do something active. Exercise or activity can help reduce stress. Walking is a great way to get started. Even everyday activities such as housecleaning or yard work can help.  Try yoga or tk chi. These techniques combine exercise and meditation. You may need some training at first to learn them.  Do something you enjoy. For example, listen to music or go to a movie. Practice your hobby or do volunteer  "work.  Meditate. This can help you relax, because you are not worrying about what happened before or what may happen in the future.  Do guided imagery. Imagine yourself in any setting that helps you feel calm. You can use online videos, books, or a teacher to guide you.  Do breathing exercises. For example:  From a standing position, bend forward from the waist with your knees slightly bent. Let your arms dangle close to the floor.  Breathe in slowly and deeply as you return to a standing position. Roll up slowly and lift your head last.  Hold your breath for just a few seconds in the standing position.  Breathe out slowly and bend forward from the waist.  Let your feelings out. Talk, laugh, cry, and express anger when you need to. Talking with supportive friends or family, a counselor, or a donaldo leader about your feelings is a healthy way to relieve stress. Avoid discussing your feelings with people who make you feel worse.  Write. It may help to write about things that are bothering you. This helps you find out how much stress you feel and what is causing it. When you know this, you can find better ways to cope.  What can you do to prevent stress?  You might try some of these things to help prevent stress:  Manage your time. This helps you find time to do the things you want and need to do.  Get enough sleep. Your body recovers from the stresses of the day while you are sleeping.  Get support. Your family, friends, and community can make a difference in how you experience stress.  Limit your news feed. Avoid or limit time on social media or news that may make you feel stressed.  Do something active. Exercise or activity can help reduce stress. Walking is a great way to get started.  Where can you learn more?  Go to https://www.GoTable.net/patiented  Enter N032 in the search box to learn more about \"Learning About Stress.\"  Current as of: October 24, 2023  Content Version: 14.3    2024 [employer]. "   Care instructions adapted under license by your healthcare professional. If you have questions about a medical condition or this instruction, always ask your healthcare professional. [employer] disclaims any warranty or liability for your use of this information.

## 2025-01-21 NOTE — PROGRESS NOTES
Preventive Care Visit  [facility]  Jerald Fenton MD, Family Medicine  Jan 21, 2025  {Provider  Link to SmartSet :600349}    {PROVIDER CHARTING PREFERENCE:157906}    Jill Salazar is a 39 year old, presenting for the following:  Physical        1/21/2025     1:45 PM   Additional Questions   Roomed by Leslie ROSSI  ***  {MA/LPN/RN Pre-Provider Visit Orders- hCG/UA/Strep (Optional):366246}  {SUPERLIST (Optional):056104}  {additonal problems for provider to add (Optional):317922}  Health Care Directive  Patient does not have a Health Care Directive: Discussed advance care planning with patient; information given to patient to review.      1/21/2025   General Health   How would you rate your overall physical health? Good   Feel stress (tense, anxious, or unable to sleep) Rather much   (!) STRESS CONCERN      1/21/2025   Nutrition   Three or more servings of calcium each day? Yes   Diet: Regular (no restrictions)   How many servings of fruit and vegetables per day? (!) 2-3   How many sweetened beverages each day? 0-1         1/21/2025   Exercise   Days per week of moderate/strenous exercise 3 days   Average minutes spent exercising at this level 30 min         1/21/2025   Social Factors   Frequency of gathering with friends or relatives Twice a week   Worry food won't last until get money to buy more Yes   Food not last or not have enough money for food? Yes   Do you have housing? (Housing is defined as stable permanent housing and does not include staying ouside in a car, in a tent, in an abandoned building, in an overnight shelter, or couch-surfing.) Yes   Are you worried about losing your housing? No   Lack of transportation? No   Unable to get utilities (heat,electricity)? No   (!) FOOD SECURITY CONCERN PRESENT      1/21/2025   Dental   Dentist two times every year? Yes         1/21/2025   TB Screening   Were you born outside of the [place]? No         Today's PHQ-2  Score:       1/21/2025    10:39 AM   PHQ-2 ( 1999 [employer])   Q1: Little interest or pleasure in doing things 1   Q2: Feeling down, depressed or hopeless 1   PHQ-2 Score 2    Q1: Little interest or pleasure in doing things Several days   Q2: Feeling down, depressed or hopeless Several days   PHQ-2 Score 2       Patient-reported           1/21/2025   Substance Use   Alcohol more than 3/day or more than 7/wk No   Do you use any other substances recreationally? No     Social History     Tobacco Use    Smoking status: Never    Smokeless tobacco: Never   Vaping Use    Vaping status: Never Used   Substance Use Topics    Alcohol use: Yes     Comment: occasionalyly    Drug use: No     {Provider  If there are gaps in the social history shown above, please follow the link to update and then refresh the note Link to Social and Substance History :882251}     Mammogram Screening - Patient under 40 years of age: Routine Mammogram Screening not recommended.         1/21/2025   STI Screening   New sexual partner(s) since last STI/HIV test? No     History of abnormal Pap smear: No - age 30- 64 PAP with HPV every 5 years recommended        Latest Ref Rng & Units 12/3/2018     5:25 PM 12/3/2018     5:17 PM 1/25/2016    12:00 AM   PAP / HPV   PAP (Historical)   NIL  NIL    HPV 16 DNA NEG^Negative Negative      HPV 18 DNA NEG^Negative Negative      Other HR HPV NEG^Negative Negative              1/21/2025   Contraception/Family Planning   Questions about contraception or family planning No     {Provider  REQUIRED FOR AWV Use the storyboard to review patient history, after sections have been marked as reviewed, refresh note to capture documentation:043041}   Reviewed and updated as needed this visit by Provider   Tobacco  Allergies  Meds  Problems  Med Hx  Surg Hx  Fam Hx            {HISTORY OPTIONS (Optional):666690}    {ROS Picklists (Optional):556190}     Objective    Exam  /72   Pulse 82   Temp 97.5  F (36.4  C)  "(Temporal)   Resp 18   Ht 1.625 m (5' 3.98\")   Wt 84.9 kg (187 lb 4 oz)   LMP 01/07/2025 (Approximate)   SpO2 100%   BMI 32.17 kg/m     Estimated body mass index is 32.17 kg/m  as calculated from the following:    Height as of this encounter: 1.625 m (5' 3.98\").    Weight as of this encounter: 84.9 kg (187 lb 4 oz).    Physical Exam  {Exam Choices (Optional):811528}        Signed Electronically by: Jerald Fenton MD  {Email feedback regarding this note to [email or web address]   :032972}  " "117/72   Pulse 82   Temp 97.5  F (36.4  C) (Temporal)   Resp 18   Ht 1.625 m (5' 3.98\")   Wt 84.9 kg (187 lb 4 oz)   LMP 01/07/2025 (Approximate)   SpO2 100%   BMI 32.17 kg/m     Estimated body mass index is 32.17 kg/m  as calculated from the following:    Height as of this encounter: 1.625 m (5' 3.98\").    Weight as of this encounter: 84.9 kg (187 lb 4 oz).    Physical Exam  Exam conducted with a chaperone present.   Constitutional:       General: She is not in acute distress.     Appearance: Normal appearance. She is well-developed.   HENT:      Right Ear: Hearing, ear canal and external ear normal. A middle ear effusion is present. Tympanic membrane is erythematous and bulging.      Left Ear: Hearing, tympanic membrane, ear canal and external ear normal.      Nose: Nose normal.      Mouth/Throat:      Pharynx: Uvula midline. No oropharyngeal exudate or posterior oropharyngeal erythema.   Eyes:      General: Lids are normal.         Right eye: No discharge.         Left eye: No discharge.      Conjunctiva/sclera: Conjunctivae normal.      Pupils: Pupils are equal, round, and reactive to light.   Neck:      Thyroid: No thyromegaly.      Trachea: No tracheal deviation.   Cardiovascular:      Rate and Rhythm: Normal rate and regular rhythm.      Pulses: Normal pulses.      Heart sounds: Normal heart sounds, S1 normal and S2 normal. No murmur heard.     No friction rub. No S3 or S4 sounds.   Pulmonary:      Effort: Pulmonary effort is normal. No respiratory distress.      Breath sounds: Normal breath sounds. No wheezing or rales.   Abdominal:      General: Bowel sounds are normal.      Palpations: Abdomen is soft. There is no mass.      Tenderness: There is no abdominal tenderness.   Genitourinary:     Labia:         Right: No rash, tenderness, lesion or injury.         Left: No rash, tenderness, lesion or injury.       Vagina: No vaginal discharge or bleeding.      Cervix: No discharge or friability.      " Comments: Pap obtained  Musculoskeletal:         General: Normal range of motion.      Cervical back: Normal range of motion and neck supple.   Lymphadenopathy:      Cervical: No cervical adenopathy.      Upper Body:      Right upper body: No supraclavicular adenopathy.      Left upper body: No supraclavicular adenopathy.   Skin:     General: Skin is warm and dry.      Findings: No rash.   Neurological:      Mental Status: She is alert and oriented to person, place, and time.      Cranial Nerves: No cranial nerve deficit.      Sensory: No sensory deficit.      Motor: No abnormal muscle tone.      Deep Tendon Reflexes: Reflexes are normal and symmetric.   Psychiatric:         Thought Content: Thought content normal.         Judgment: Judgment normal.               Signed Electronically by: Jerald Fenton MD

## 2025-01-22 LAB
HPV HR 12 DNA CVX QL NAA+PROBE: NEGATIVE
HPV16 DNA CVX QL NAA+PROBE: NEGATIVE
HPV18 DNA CVX QL NAA+PROBE: NEGATIVE
HUMAN PAPILLOMA VIRUS FINAL DIAGNOSIS: NORMAL

## 2025-01-24 LAB
BKR AP ASSOCIATED HPV REPORT: NORMAL
BKR LAB AP GYN ADEQUACY: NORMAL
BKR LAB AP GYN INTERPRETATION: NORMAL
BKR LAB AP PREVIOUS ABNORMAL: NORMAL
PATH REPORT.COMMENTS IMP SPEC: NORMAL
PATH REPORT.COMMENTS IMP SPEC: NORMAL
PATH REPORT.RELEVANT HX SPEC: NORMAL